# Patient Record
Sex: FEMALE | Race: BLACK OR AFRICAN AMERICAN | NOT HISPANIC OR LATINO | Employment: FULL TIME | URBAN - METROPOLITAN AREA
[De-identification: names, ages, dates, MRNs, and addresses within clinical notes are randomized per-mention and may not be internally consistent; named-entity substitution may affect disease eponyms.]

---

## 2024-07-05 ENCOUNTER — OFFICE VISIT (OUTPATIENT)
Dept: OPHTHALMOLOGY | Facility: CLINIC | Age: 54
End: 2024-07-05
Payer: COMMERCIAL

## 2024-07-05 DIAGNOSIS — H40.1221 LOW-TENSION GLAUCOMA OF LEFT EYE, MILD STAGE: ICD-10-CM

## 2024-07-05 DIAGNOSIS — E11.9 DIABETES MELLITUS WITHOUT COMPLICATION (MULTI): ICD-10-CM

## 2024-07-05 DIAGNOSIS — H25.13 AGE-RELATED NUCLEAR CATARACT OF BOTH EYES: ICD-10-CM

## 2024-07-05 DIAGNOSIS — H40.1212 LOW-TENSION GLAUCOMA OF RIGHT EYE, MODERATE STAGE: Primary | ICD-10-CM

## 2024-07-05 DIAGNOSIS — H52.7 UNSPECIFIED DISORDER OF REFRACTION: ICD-10-CM

## 2024-07-05 PROCEDURE — 92133 CPTRZD OPH DX IMG PST SGM ON: CPT | Performed by: OPHTHALMOLOGY

## 2024-07-05 PROCEDURE — 99204 OFFICE O/P NEW MOD 45 MIN: CPT | Performed by: OPHTHALMOLOGY

## 2024-07-05 PROCEDURE — 99214 OFFICE O/P EST MOD 30 MIN: CPT | Performed by: OPHTHALMOLOGY

## 2024-07-05 RX ORDER — LOSARTAN POTASSIUM 25 MG/1
TABLET ORAL EVERY 24 HOURS
COMMUNITY

## 2024-07-05 RX ORDER — LATANOPROST 50 UG/ML
1 SOLUTION/ DROPS OPHTHALMIC NIGHTLY
Qty: 2.5 ML | Refills: 11 | Status: SHIPPED | OUTPATIENT
Start: 2024-07-05 | End: 2025-07-05

## 2024-07-05 RX ORDER — DAPAGLIFLOZIN 10 MG/1
1 TABLET, FILM COATED ORAL DAILY
COMMUNITY

## 2024-07-05 RX ORDER — LATANOPROST 50 UG/ML
1 SOLUTION/ DROPS OPHTHALMIC NIGHTLY
COMMUNITY
Start: 2023-04-02 | End: 2024-07-05 | Stop reason: ALTCHOICE

## 2024-07-05 RX ORDER — LOVASTATIN 20 MG/1
TABLET ORAL
COMMUNITY

## 2024-07-05 RX ORDER — ACETAMINOPHEN, DEXTROMETHORPHAN HBR, DOXYLAMINE SUCCINATE, PHENYLEPHRINE HCL 650; 20; 12.5; 1 MG/30ML; MG/30ML; MG/30ML; MG/30ML
SOLUTION ORAL
COMMUNITY

## 2024-07-05 RX ORDER — ERGOCALCIFEROL 1.25 MG/1
CAPSULE ORAL
COMMUNITY

## 2024-07-05 ASSESSMENT — KERATOMETRY
OS_K2POWER_DIOPTERS: 44.75
OS_AXISANGLE_DEGREES: 90
OD_K2POWER_DIOPTERS: 44.50
OD_AXISANGLE_DEGREES: 80
METHOD_AUTO_MANUAL: AUTOMATED
OS_K1POWER_DIOPTERS: 44.00
OD_AXISANGLE2_DEGREES: 170
OD_K1POWER_DIOPTERS: 43.50
OS_AXISANGLE2_DEGREES: 180

## 2024-07-05 ASSESSMENT — REFRACTION_WEARINGRX
OS_SPHERE: -1.00
OS_AXIS: 047
OD_AXIS: 081
OD_SPHERE: -1.25
OD_CYLINDER: -0.25
OS_CYLINDER: -0.25

## 2024-07-05 ASSESSMENT — PACHYMETRY
OD_CT(UM): 505
OS_CT(UM): 519

## 2024-07-05 ASSESSMENT — ENCOUNTER SYMPTOMS
EYES NEGATIVE: 0
CONSTITUTIONAL NEGATIVE: 0
CARDIOVASCULAR NEGATIVE: 0
NEUROLOGICAL NEGATIVE: 0
ENDOCRINE NEGATIVE: 0
RESPIRATORY NEGATIVE: 0
GASTROINTESTINAL NEGATIVE: 0
MUSCULOSKELETAL NEGATIVE: 0
HEMATOLOGIC/LYMPHATIC NEGATIVE: 0
PSYCHIATRIC NEGATIVE: 0
ALLERGIC/IMMUNOLOGIC NEGATIVE: 0

## 2024-07-05 ASSESSMENT — SLIT LAMP EXAM - LIDS
COMMENTS: NORMAL
COMMENTS: NORMAL

## 2024-07-05 ASSESSMENT — VISUAL ACUITY
METHOD: SNELLEN - SINGLE
OD_CC: 20/25
CORRECTION_TYPE: GLASSES
OD_CC+: +1
OS_CC: 20/20
OS_CC+: -1

## 2024-07-05 ASSESSMENT — REFRACTION_MANIFEST
METHOD_AUTOREFRACTION: 1
OS_SPHERE: -1.25
OS_CYLINDER: -0.25
OD_AXIS: 045
OD_CYLINDER: -0.25
OD_SPHERE: -1.25
OS_AXIS: 165

## 2024-07-05 ASSESSMENT — TONOMETRY
OS_IOP_MMHG: 16
OD_IOP_MMHG: 16
IOP_METHOD: GOLDMANN APPLANATION

## 2024-07-05 ASSESSMENT — PATIENT HEALTH QUESTIONNAIRE - PHQ9
2. FEELING DOWN, DEPRESSED OR HOPELESS: NOT AT ALL
1. LITTLE INTEREST OR PLEASURE IN DOING THINGS: NOT AT ALL
SUM OF ALL RESPONSES TO PHQ9 QUESTIONS 1 AND 2: 0

## 2024-07-05 ASSESSMENT — EXTERNAL EXAM - RIGHT EYE: OD_EXAM: NORMAL

## 2024-07-05 ASSESSMENT — CUP TO DISC RATIO
OD_RATIO: 0.65
OS_RATIO: 0.5

## 2024-07-05 ASSESSMENT — EXTERNAL EXAM - LEFT EYE: OS_EXAM: NORMAL

## 2024-07-05 ASSESSMENT — PAIN SCALES - GENERAL: PAINLEVEL: 0-NO PAIN

## 2024-07-05 NOTE — PROGRESS NOTES
Assessment/Plan   Problem List Items Addressed This Visit       Low-tension glaucoma of right eye, moderate stage     OCT with potential RNFL reduction with time, overall better quality than previous but still seems consistent with glaucomatous damage. Would consider low tension. Will restart latanoprost at bedtime and monitor with serial exam.          Relevant Medications    latanoprost (Xalatan) 0.005 % ophthalmic solution    Other Relevant Orders    OCT, Optic Nerve - OU - Both Eyes (Completed)    Low-tension glaucoma of left eye, mild stage - Primary     RNFL more stable, no significant reduction in interim. Might represent NTG that does not progress. With treating right eye (OD) will recommend treatment left eye (OS) as well.          Relevant Medications    latanoprost (Xalatan) 0.005 % ophthalmic solution    Other Relevant Orders    OCT, Optic Nerve - OU - Both Eyes (Completed)    Diabetes mellitus without complication (Multi)     Advised no signs of diabetic changes on exam. Recommend to continue best sugar control and on need for annual checkup. Understands role of good BP control and weight loss if applicable. Discussed some components of selected studies like WESDR, DCCT, and UKPDS to describe the likely course of diabetes and effects on the eyes.           Age-related nuclear cataract of both eyes     Non significant cataract noted on exam. Will plan to continue to monitor with serial exam.            Unspecified disorder of refraction     Discussed glasses prescription from refraction. Will provide if patient interested in keeping for records or to fill as a new set of glasses.               Provided reassurance regarding above diagnoses and care received in the office visit today. Discussed outcomes and options along with the importance of treatment compliance. Understands the importance of any follow up visits. Patient instructed to call/communicate with our office if any new issues, questions, or  concerns.     Will plan to see back in 6 months short check with OCT nerve or sooner PRN

## 2024-07-05 NOTE — ASSESSMENT & PLAN NOTE
OCT with potential RNFL reduction with time, overall better quality than previous but still seems consistent with glaucomatous damage. Would consider low tension. Will restart latanoprost at bedtime and monitor with serial exam.

## 2024-07-05 NOTE — ASSESSMENT & PLAN NOTE
RNFL more stable, no significant reduction in interim. Might represent NTG that does not progress. With treating right eye (OD) will recommend treatment left eye (OS) as well.

## 2024-07-05 NOTE — PATIENT INSTRUCTIONS
Thank you so much for choosing me to provide your care today!    If you were dilated your vision may remain blurry   or light sensitive for several hours.    The nature of eye and vision problems can require frequent follow up, please make every effort to adhere to any future appointments.    If you have any issues, questions, or concerns,   please do not hesitate to reach out.    If you receive a survey in regards to your care today, please mention any exceptional care my office staff and/or technicians provided.    You can reach our office at this number:  [phone]

## 2024-07-05 NOTE — LETTER
July 5, 2024    Maikel Garcse MD  [address] [address] [address], [address]  [facility] [address]    Patient: Milagros Parra   YOB: 1970   Date of Visit: 7/5/2024       Dear Dr. Maikel Garces MD:    Milagros Parra will be establishing care with you at a future visit. Here is my assessment and plan of care:    Assessment/Plan:  Milagros was seen today for annual exam and decreased visual acuity.  Diagnoses and all orders for this visit:  Low-tension glaucoma of right eye, moderate stage (Primary)  -     OCT, Optic Nerve - OU - Both Eyes  -     latanoprost (Xalatan) 0.005 % ophthalmic solution; Administer 1 drop into both eyes once daily at bedtime.  Low-tension glaucoma of left eye, mild stage  -     OCT, Optic Nerve - OU - Both Eyes  -     latanoprost (Xalatan) 0.005 % ophthalmic solution; Administer 1 drop into both eyes once daily at bedtime.  Diabetes mellitus without complication (Multi)  Age-related nuclear cataract of both eyes  Unspecified disorder of refraction    Right eye:     Left eye:    [x] No diabetes mellitus (DM) retinopathy [x] No diabetes mellitus (DM) retinopathy    [] Mild non-proliferative retinopathy [] Mild non-proliferative retinopathy    [] Moderate non-proliferative retinopathy [] Moderate non-proliferative retinopathy    [] Severe non-proliferative retinopathy [] Severe non-proliferative retinopathy    [] Proliferative retinopathy   [] Proliferative retinopathy    [] Diabetic macular edema   [] Diabetic macular edema    Urged patient to continue to work on best blood sugar and blood pressure control  Advised patient to call if any new vision changes noted    Will plan to repeat evaluation in:   [] 3 months [] 6 months [] 9 months [x] 12 months [] Other    Below you can find relevant pieces of the exam. If you have questions, please do not hesitate to call me. I look forward to following Milagros along with you.         Sincerely,        Chris Calloway,  "MD        CC:   No Recipients         External Exam         Right Left    External Normal Normal              Slit Lamp Exam         Right Left    Lids/Lashes Normal Normal    Conjunctiva/Sclera White and quiet White and quiet    Cornea Clear Clear    Anterior Chamber Deep and quiet Deep and quiet    Iris Round and reactive Round and reactive    Lens 1+ nuclear sclerosis 1+ Nuclear sclerosis              Fundus Exam         Right Left    Vitreous vitreous clear and normal vitreous clear and normal    Disc Normal Normal    C/D Ratio 0.65 0.5    Macula Normal Normal    Vessels Normal Normal    Periphery Normal Normal                   <div id=\"MAIN_EXAM_REVIEWED\"></div>     "

## 2024-07-31 ENCOUNTER — APPOINTMENT (OUTPATIENT)
Dept: PRIMARY CARE | Facility: CLINIC | Age: 54
End: 2024-07-31
Payer: COMMERCIAL

## 2024-08-01 ENCOUNTER — APPOINTMENT (OUTPATIENT)
Dept: PRIMARY CARE | Facility: CLINIC | Age: 54
End: 2024-08-01
Payer: COMMERCIAL

## 2024-09-03 ENCOUNTER — APPOINTMENT (OUTPATIENT)
Dept: PRIMARY CARE | Facility: CLINIC | Age: 54
End: 2024-09-03
Payer: COMMERCIAL

## 2024-09-20 ENCOUNTER — HOSPITAL ENCOUNTER (OUTPATIENT)
Dept: RADIOLOGY | Facility: HOSPITAL | Age: 54
Discharge: HOME | End: 2024-09-20
Payer: COMMERCIAL

## 2024-09-20 VITALS — HEIGHT: 59 IN | BODY MASS INDEX: 45.16 KG/M2 | WEIGHT: 224 LBS

## 2024-09-20 DIAGNOSIS — Z12.31 SCREENING MAMMOGRAM FOR BREAST CANCER: ICD-10-CM

## 2024-09-20 PROCEDURE — 77067 SCR MAMMO BI INCL CAD: CPT

## 2024-10-02 ENCOUNTER — OFFICE VISIT (OUTPATIENT)
Dept: PRIMARY CARE | Facility: CLINIC | Age: 54
End: 2024-10-02
Payer: COMMERCIAL

## 2024-10-02 ENCOUNTER — LAB (OUTPATIENT)
Dept: LAB | Facility: LAB | Age: 54
End: 2024-10-02
Payer: COMMERCIAL

## 2024-10-02 VITALS
HEART RATE: 70 BPM | WEIGHT: 223 LBS | TEMPERATURE: 97.8 F | OXYGEN SATURATION: 97 % | DIASTOLIC BLOOD PRESSURE: 82 MMHG | SYSTOLIC BLOOD PRESSURE: 140 MMHG | HEIGHT: 59 IN | BODY MASS INDEX: 44.96 KG/M2

## 2024-10-02 DIAGNOSIS — E78.5 DYSLIPIDEMIA: ICD-10-CM

## 2024-10-02 DIAGNOSIS — E11.9 TYPE 2 DIABETES MELLITUS WITHOUT COMPLICATION, WITHOUT LONG-TERM CURRENT USE OF INSULIN (MULTI): ICD-10-CM

## 2024-10-02 DIAGNOSIS — E04.1 THYROID CYST: ICD-10-CM

## 2024-10-02 DIAGNOSIS — K59.00 CONSTIPATION, UNSPECIFIED CONSTIPATION TYPE: ICD-10-CM

## 2024-10-02 DIAGNOSIS — N18.30 STAGE 3 CHRONIC KIDNEY DISEASE, UNSPECIFIED WHETHER STAGE 3A OR 3B CKD (MULTI): ICD-10-CM

## 2024-10-02 DIAGNOSIS — I10 ESSENTIAL HYPERTENSION: Primary | ICD-10-CM

## 2024-10-02 DIAGNOSIS — M54.32 LEFT SIDED SCIATICA: ICD-10-CM

## 2024-10-02 DIAGNOSIS — E55.9 VITAMIN D DEFICIENCY: ICD-10-CM

## 2024-10-02 DIAGNOSIS — I10 ESSENTIAL HYPERTENSION: ICD-10-CM

## 2024-10-02 LAB
25(OH)D3 SERPL-MCNC: 44 NG/ML (ref 30–100)
ANION GAP SERPL CALCULATED.3IONS-SCNC: 12 MMOL/L (ref 10–20)
BUN SERPL-MCNC: 11 MG/DL (ref 6–23)
CALCIUM SERPL-MCNC: 9 MG/DL (ref 8.6–10.3)
CHLORIDE SERPL-SCNC: 102 MMOL/L (ref 98–107)
CHOLEST SERPL-MCNC: 168 MG/DL (ref 0–199)
CHOLEST/HDLC SERPL: 2.9 {RATIO}
CO2 SERPL-SCNC: 29 MMOL/L (ref 21–32)
CREAT SERPL-MCNC: 0.77 MG/DL (ref 0.5–1.05)
EGFRCR SERPLBLD CKD-EPI 2021: >90 ML/MIN/1.73M*2
EST. AVERAGE GLUCOSE BLD GHB EST-MCNC: 160 MG/DL
GLUCOSE SERPL-MCNC: 100 MG/DL (ref 74–99)
HBA1C MFR BLD: 7.2 %
HDLC SERPL-MCNC: 58.9 MG/DL
LDLC SERPL CALC-MCNC: 88 MG/DL
NON HDL CHOLESTEROL: 109 MG/DL (ref 0–149)
POTASSIUM SERPL-SCNC: 4 MMOL/L (ref 3.5–5.3)
SODIUM SERPL-SCNC: 139 MMOL/L (ref 136–145)
TRIGL SERPL-MCNC: 104 MG/DL (ref 0–149)
TSH SERPL-ACNC: 2.86 MIU/L (ref 0.44–3.98)
VLDL: 21 MG/DL (ref 0–40)

## 2024-10-02 PROCEDURE — 82306 VITAMIN D 25 HYDROXY: CPT

## 2024-10-02 PROCEDURE — 3079F DIAST BP 80-89 MM HG: CPT | Performed by: INTERNAL MEDICINE

## 2024-10-02 PROCEDURE — 3048F LDL-C <100 MG/DL: CPT | Performed by: INTERNAL MEDICINE

## 2024-10-02 PROCEDURE — 3008F BODY MASS INDEX DOCD: CPT | Performed by: INTERNAL MEDICINE

## 2024-10-02 PROCEDURE — 80061 LIPID PANEL: CPT

## 2024-10-02 PROCEDURE — 99204 OFFICE O/P NEW MOD 45 MIN: CPT | Performed by: INTERNAL MEDICINE

## 2024-10-02 PROCEDURE — 84443 ASSAY THYROID STIM HORMONE: CPT

## 2024-10-02 PROCEDURE — 80048 BASIC METABOLIC PNL TOTAL CA: CPT

## 2024-10-02 PROCEDURE — 3077F SYST BP >= 140 MM HG: CPT | Performed by: INTERNAL MEDICINE

## 2024-10-02 PROCEDURE — 83036 HEMOGLOBIN GLYCOSYLATED A1C: CPT

## 2024-10-02 PROCEDURE — 4010F ACE/ARB THERAPY RXD/TAKEN: CPT | Performed by: INTERNAL MEDICINE

## 2024-10-02 PROCEDURE — 36415 COLL VENOUS BLD VENIPUNCTURE: CPT

## 2024-10-02 PROCEDURE — 3051F HG A1C>EQUAL 7.0%<8.0%: CPT | Performed by: INTERNAL MEDICINE

## 2024-10-02 RX ORDER — DAPAGLIFLOZIN 10 MG/1
10 TABLET, FILM COATED ORAL DAILY
Qty: 90 TABLET | Refills: 1 | Status: SHIPPED | OUTPATIENT
Start: 2024-10-02

## 2024-10-02 RX ORDER — LOSARTAN POTASSIUM 50 MG/1
50 TABLET ORAL EVERY 24 HOURS
Qty: 90 TABLET | Refills: 1 | Status: SHIPPED | OUTPATIENT
Start: 2024-10-02

## 2024-10-02 RX ORDER — LOVASTATIN 20 MG/1
20 TABLET ORAL NIGHTLY
Qty: 90 TABLET | Refills: 1 | Status: SHIPPED | OUTPATIENT
Start: 2024-10-02

## 2024-10-02 RX ORDER — ERGOCALCIFEROL 1.25 MG/1
50000 CAPSULE ORAL WEEKLY
Qty: 90 CAPSULE | Refills: 1 | Status: SHIPPED | OUTPATIENT
Start: 2024-10-02

## 2024-10-02 ASSESSMENT — ENCOUNTER SYMPTOMS
DEPRESSION: 0
OCCASIONAL FEELINGS OF UNSTEADINESS: 0
LOSS OF SENSATION IN FEET: 0

## 2024-10-02 ASSESSMENT — COLUMBIA-SUICIDE SEVERITY RATING SCALE - C-SSRS
6. HAVE YOU EVER DONE ANYTHING, STARTED TO DO ANYTHING, OR PREPARED TO DO ANYTHING TO END YOUR LIFE?: NO
2. HAVE YOU ACTUALLY HAD ANY THOUGHTS OF KILLING YOURSELF?: NO
1. IN THE PAST MONTH, HAVE YOU WISHED YOU WERE DEAD OR WISHED YOU COULD GO TO SLEEP AND NOT WAKE UP?: NO

## 2024-10-02 ASSESSMENT — PAIN SCALES - GENERAL: PAINLEVEL: 0-NO PAIN

## 2024-10-02 ASSESSMENT — PATIENT HEALTH QUESTIONNAIRE - PHQ9
SUM OF ALL RESPONSES TO PHQ9 QUESTIONS 1 AND 2: 0
2. FEELING DOWN, DEPRESSED OR HOPELESS: NOT AT ALL
1. LITTLE INTEREST OR PLEASURE IN DOING THINGS: NOT AT ALL

## 2024-10-02 NOTE — PROGRESS NOTES
"Subjective   Patient ID: Milagros Parra is a 54 y.o. female who presents for Establish Care (Sciatic nerve, thyroid issues, pt would like colonoscopy/constipation).    HPI     She was patient of Dr Dodd  She has history of hypertension, diabetes mellitus, CKD  She was last seen at [facility] less than a month ago  H/O DM- was started on Farxiga by her previous PCP,  stopped metformin ( no side effects)   Chronic constipation, taking miralax helps, per patient had colonoscopy in 2021  H/O thyroid cyst, last US was in 2022  Right biceps tendon repair in right shoulder, Dr Bernal in 2020  Left sided sciatic pain for past few weeks X 2 years  H/O Hypertension- stopped losartan few years ago, not sure of the reason, was on 100 mg, she was just started on 25 mg when she went to ER few months ago    Complaining of left-sided low back pain radiating down left leg, denied any tingling or numbness/weakness of left lower extremity    Review of Systems   Constitutional:  Negative for chills, fatigue and unexpected weight change.   HENT:  Negative for postnasal drip, sinus pressure and trouble swallowing.    Respiratory:  Negative for cough, shortness of breath and wheezing.    Cardiovascular:  Negative for chest pain, palpitations and leg swelling.   Gastrointestinal:  Positive for constipation. Negative for abdominal pain, blood in stool, nausea and vomiting.   Endocrine: Negative for polydipsia, polyphagia and polyuria.   Genitourinary:  Negative for dysuria and frequency.   Musculoskeletal:  Positive for back pain. Negative for myalgias.   Skin:  Negative for rash.   Neurological:  Negative for tremors, seizures and numbness.   Psychiatric/Behavioral:  Negative for behavioral problems.        Objective   /82 (BP Location: Left arm, Patient Position: Sitting, BP Cuff Size: Large adult)   Pulse 70   Temp 36.6 °C (97.8 °F) (Temporal)   Ht 1.499 m (4' 11\")   Wt 101 kg (223 lb)   SpO2 97%   BMI 45.04 kg/m² "     Physical Exam  Constitutional:       General: She is not in acute distress.     Appearance: Normal appearance.   HENT:      Head: Normocephalic and atraumatic.   Eyes:      Extraocular Movements: Extraocular movements intact.      Conjunctiva/sclera: Conjunctivae normal.      Pupils: Pupils are equal, round, and reactive to light.   Cardiovascular:      Rate and Rhythm: Normal rate and regular rhythm.      Heart sounds: Normal heart sounds. No murmur heard.     No friction rub.   Pulmonary:      Effort: Pulmonary effort is normal.      Breath sounds: Normal breath sounds. No wheezing or rales.   Abdominal:      General: Bowel sounds are normal.      Palpations: Abdomen is soft.      Tenderness: There is no abdominal tenderness. There is no guarding.   Musculoskeletal:         General: Normal range of motion.      Cervical back: Normal range of motion and neck supple.      Right lower leg: No edema.      Left lower leg: No edema.   Lymphadenopathy:      Cervical: No cervical adenopathy.   Neurological:      General: No focal deficit present.      Mental Status: She is alert and oriented to person, place, and time.      Cranial Nerves: No cranial nerve deficit.      Gait: Gait normal.   Psychiatric:         Mood and Affect: Mood normal.         Assessment/Plan        Milagros was seen today for establish care.  Diagnoses and all orders for this visit:  Essential hypertension (Primary)  -     losartan (Cozaar) 50 mg tablet; Take 1 tablet (50 mg) by mouth once every 24 hours.  -     Basic Metabolic Panel; Future  Type 2 diabetes mellitus without complication, without long-term current use of insulin (Multi)  -     dapagliflozin propanediol (Farxiga) 10 mg; Take 1 tablet (10 mg) by mouth once daily.  -     Hemoglobin A1C; Future  Thyroid cyst  -     TSH with reflex to Free T4 if abnormal; Future  -     US thyroid; Future  Vitamin D deficiency  -     ergocalciferol (Vitamin D-2) 1.25 MG (79375 UT) capsule; Take 1  capsule (50,000 Units) by mouth 1 (one) time per week.  -     Vitamin D 25-Hydroxy,Total (for eval of Vitamin D levels); Future  Stage 3 chronic kidney disease, unspecified whether stage 3a or 3b CKD (Multi)  Dyslipidemia  -     lovastatin (Mevacor) 20 mg tablet; Take 1 tablet (20 mg) by mouth once daily at bedtime.  -     Lipid Panel; Future  Left sided sciatica  -     Referral to Physical Therapy; Future  Constipation, unspecified constipation type    Advise low-salt diet, increased losartan to 50 mg from 25 mg  Check blood pressure at home, follow-up with home blood pressure readings,  Our goal blood pressure reading is less than 140/80  Referred to physical therapy for left-sided sciatica    Past Medical History:   Diagnosis Date    Age-related nuclear cataract of both eyes 7/5/2024    Diabetes mellitus without complication (Multi) 7/5/2024    Low-tension glaucoma of left eye, mild stage 7/5/2024    Low-tension glaucoma of right eye, moderate stage 7/5/2024    Unspecified disorder of refraction 7/5/2024     Past Surgical History:   Procedure Laterality Date    HYSTERECTOMY         [facility]  Outside Information  Results  CT angio chest w and wo IV contrast (Order [id number])     CT angio chest w and wo IV contrast  Order: [id number]  Impression    IMPRESSION:  1.  Normal thoracoabdominal aorta.  2.  Hepatic steatosis  3.  Colonic diverticulosis          Transcribed Using Voice Recognition  Transcribe Date/Time: Aug 14 2024  9:55P    Dictated by: PATRICIA RIVERA MD    This examination was interpreted and the report reviewed and  electronically signed by:  PATRICIA RIVERA MD on Aug 14 2024 10:03PM  EST  Narrative    * * *Final Report* * *    DATE OF EXAM: Aug 14 2024  9:29PM      EUC   0126  -  CTA CHEST (GATED) WO/W IVCON  / ACCESSION #  [id number]    PROCEDURE REASON: Aortic dissection suspected        * * * * Physician Interpretation * * * *    RESULT: EXAMINATION:  CTA CHEST (GATED) WO/W IVCON, CTA  ABD/PEL W IVCON    CLINICAL HISTORY: Pain Acute Dissection    Technique:   --  3D MIP image were created, reviewed and archived .    Exam Date:  8/14/2024 9:29 PM  Comparison: Chest radiograph 08/14/2024, CT abdomen and pelvis 11/27/2020    Contrast:   ml of Omnipaque 350    CT Radiation dose: Integrated Dose-length product (DLP) for this visit =    2047 mGy*cm  CT Dose Reduction Employed: Automated exposure control(AEC) and iterative  recon      RESULT:  No cardiomegaly or pericardial effusion.  Lungs are clear.  No  mediastinal, hilar lymphadenopathy.  Normal thoracoabdominal aorta without aneurysm, dissection, penetrating  atheromatous ulceration.  No pulmonary embolus.  Patent mesenteric, renal  arteries.  Patent branch iliac arteries.  Patent great vessels.    Hepatic steatosis. No radiopaque gallstones.  Normal appendix.  Colonic  diverticulosis.  Normal kidneys, adrenal glands, pancreas, spleen,  stomach, urinary bladder.  No suspicious osseous lesion.  Right  perivaginal cyst unchanged  Exam End: 08/14/24 21:29    Specimen Collected: 08/14/24 21:29      US thyroid  Order: [id number]  Narrative    PERFORMED AT [facility] LOCATION:[address]      Date of Service: 03/11/2022 18:53 Adm #: [id number]  Reading Dr:MARYCRUZ MORELOS Signoff Dr: MARYCRUZ MORELOS  PROCEDURE:     THYROID - WUS  2689  REASON FOR EXAM: E04.1 NONTOXIC SINGLE THYROID NODULE  RESULT: Clinical Information: Follow-up thyroid cyst.  Comparison study: 2/27/2020.  Real-time ultrasound of the thyroid gland was performed.  The right lobe measures 4.2 x 1.2 x 1.3 cm.   The left lobe measures 4.4 x 0.9 x 1.7 cm.   The thyroid isthmus measures 0.1 cm in thickness.  There is redemonstration of a small cyst in the upper pole of the left  thyroid lobe measuring 4 x 2 x 3 mm   similar to the prior exam. No new  lesions are identified. On Doppler ultrasound there is symmetric  bilateral blood flow.  Impression: Stable minute colloid cyst in the  upper pole of the left  thyroid lobe.  [id number]  This report has been produced using speech recognition.  Original Interpreting Physician:   MARYCRUZ MORELOS M.D.  Original Transcribed by/Date: PSCB   Mar 12 2022 11:08A  Original Electronically Signed by/Date: MARYCRUZ MORELOS M.D. Mar 12 2022  11:08A  Addendum Interpreting Physician:  Addendum Transcribed by/Date:   NO ADDENDUM  Addendum Electronically Signed by/Date:  Exam End: --    Specimen Collected: 03/11/22 Last Resulted: 03/11/22 00:00   Received From: [facility]  Result Received: 07/05/24 13:43      Current Outpatient Medications   Medication Instructions    cyanocobalamin, vitamin B-12, (Vitamin B-12) 1,000 mcg tablet extended release 1 tablet Orally three days a week    dapagliflozin propanediol (FARXIGA) 10 mg, oral, Daily    ergocalciferol (VITAMIN D-2) 50,000 Units, oral, Weekly    latanoprost (Xalatan) 0.005 % ophthalmic solution 1 drop, Both Eyes, Nightly    losartan (COZAAR) 50 mg, oral, Every 24 hours    lovastatin (MEVACOR) 20 mg, oral, Nightly

## 2024-10-03 ENCOUNTER — HOSPITAL ENCOUNTER (OUTPATIENT)
Dept: RADIOLOGY | Facility: HOSPITAL | Age: 54
Discharge: HOME | End: 2024-10-03
Payer: COMMERCIAL

## 2024-10-03 ENCOUNTER — APPOINTMENT (OUTPATIENT)
Dept: RADIOLOGY | Facility: HOSPITAL | Age: 54
End: 2024-10-03
Payer: COMMERCIAL

## 2024-10-03 DIAGNOSIS — E04.1 THYROID CYST: ICD-10-CM

## 2024-10-03 PROCEDURE — 76536 US EXAM OF HEAD AND NECK: CPT

## 2024-10-07 ASSESSMENT — ENCOUNTER SYMPTOMS
MYALGIAS: 0
POLYPHAGIA: 0
NUMBNESS: 0
SHORTNESS OF BREATH: 0
COUGH: 0
BACK PAIN: 1
SEIZURES: 0
TREMORS: 0
BLOOD IN STOOL: 0
FREQUENCY: 0
UNEXPECTED WEIGHT CHANGE: 0
FATIGUE: 0
ABDOMINAL PAIN: 0
WHEEZING: 0
POLYDIPSIA: 0
DYSURIA: 0
VOMITING: 0
NAUSEA: 0
CHILLS: 0
CONSTIPATION: 1
TROUBLE SWALLOWING: 0
SINUS PRESSURE: 0
PALPITATIONS: 0

## 2024-10-11 NOTE — TELEPHONE ENCOUNTER
CALLED TO PROCESS PRIOR AUTH. SPOKE WITH REY MATHUR AT THE PATIENTS INSURANCE PLAN, WHEN PROCESSING THE PRIOR AUTH THE ID NUMBER IS DIFFERENT. REENTERED THE INFORMATION INTO COVER MY MEDS AND WILL HAVE AN ANSWER IN 5 DAYS

## 2024-10-13 ENCOUNTER — HOSPITAL ENCOUNTER (EMERGENCY)
Facility: HOSPITAL | Age: 54
Discharge: HOME | End: 2024-10-13
Payer: COMMERCIAL

## 2024-10-13 VITALS
RESPIRATION RATE: 18 BRPM | WEIGHT: 225 LBS | HEART RATE: 60 BPM | TEMPERATURE: 96.8 F | HEIGHT: 59 IN | SYSTOLIC BLOOD PRESSURE: 130 MMHG | DIASTOLIC BLOOD PRESSURE: 74 MMHG | OXYGEN SATURATION: 100 % | BODY MASS INDEX: 45.36 KG/M2
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
## 2024-10-13 DIAGNOSIS — L03.115 CELLULITIS OF RIGHT LOWER EXTREMITY: ICD-10-CM

## 2024-10-13 PROCEDURE — 2500000001 HC RX 250 WO HCPCS SELF ADMINISTERED DRUGS (ALT 637 FOR MEDICARE OP): Performed by: PHYSICIAN ASSISTANT

## 2024-10-13 PROCEDURE — 99283 EMERGENCY DEPT VISIT LOW MDM: CPT

## 2024-10-13 RX ORDER — DIPHENHYDRAMINE HCL 25 MG
25 TABLET ORAL EVERY 6 HOURS
Qty: 20 TABLET | Refills: 0 | Status: SHIPPED | OUTPATIENT
Start: 2024-10-13 | End: 2024-10-18

## 2024-10-13 RX ORDER — DIPHENHYDRAMINE HCL 25 MG
25 TABLET ORAL ONCE
Status: COMPLETED | OUTPATIENT
Start: 2024-10-13 | End: 2024-10-13

## 2024-10-13 RX ORDER — DOXYCYCLINE 100 MG/1
100 CAPSULE ORAL ONCE
Status: COMPLETED | OUTPATIENT
Start: 2024-10-13 | End: 2024-10-13

## 2024-10-13 RX ORDER — DOXYCYCLINE 100 MG/1
100 CAPSULE ORAL 2 TIMES DAILY
Qty: 20 CAPSULE | Refills: 0 | Status: SHIPPED | OUTPATIENT
Start: 2024-10-13 | End: 2024-10-23

## 2024-10-13 RX ORDER — HYDROCORTISONE 1 %
1 CREAM (GRAM) TOPICAL 2 TIMES DAILY
Qty: 6 G | Refills: 0 | Status: SHIPPED | OUTPATIENT
Start: 2024-10-13 | End: 2024-11-12

## 2024-10-13 ASSESSMENT — LIFESTYLE VARIABLES
EVER FELT BAD OR GUILTY ABOUT YOUR DRINKING: NO
HAVE PEOPLE ANNOYED YOU BY CRITICIZING YOUR DRINKING: NO
HAVE YOU EVER FELT YOU SHOULD CUT DOWN ON YOUR DRINKING: NO
EVER HAD A DRINK FIRST THING IN THE MORNING TO STEADY YOUR NERVES TO GET RID OF A HANGOVER: NO
TOTAL SCORE: 0

## 2024-10-13 ASSESSMENT — COLUMBIA-SUICIDE SEVERITY RATING SCALE - C-SSRS
1. IN THE PAST MONTH, HAVE YOU WISHED YOU WERE DEAD OR WISHED YOU COULD GO TO SLEEP AND NOT WAKE UP?: NO
2. HAVE YOU ACTUALLY HAD ANY THOUGHTS OF KILLING YOURSELF?: NO
6. HAVE YOU EVER DONE ANYTHING, STARTED TO DO ANYTHING, OR PREPARED TO DO ANYTHING TO END YOUR LIFE?: NO

## 2024-10-13 ASSESSMENT — PAIN - FUNCTIONAL ASSESSMENT: PAIN_FUNCTIONAL_ASSESSMENT: 0-10

## 2024-10-13 ASSESSMENT — PAIN SCALES - GENERAL: PAINLEVEL_OUTOF10: 0 - NO PAIN

## 2024-10-13 NOTE — ED PROVIDER NOTES
HPI   Chief Complaint   Patient presents with    Insect Bite       54-year-old female presented emergency department the chief complaint of redness to the anterior aspect of her right lower extremity.  She was bit by a bug several days ago.  She states it is now warm and red and irritated.  She denies lightheadedness dizziness numbness weakness.  Well-appearing nontoxic afebrile.  No other complaint.              Patient History   Past Medical History:   Diagnosis Date    Age-related nuclear cataract of both eyes 7/5/2024    Diabetes mellitus without complication (Multi) 7/5/2024    Low-tension glaucoma of left eye, mild stage 7/5/2024    Low-tension glaucoma of right eye, moderate stage 7/5/2024    Unspecified disorder of refraction 7/5/2024     Past Surgical History:   Procedure Laterality Date    HYSTERECTOMY       Family History   Problem Relation Name Age of Onset    Colon cancer Maternal Grandmother Tameka rabago      Social History     Tobacco Use    Smoking status: Never    Smokeless tobacco: Never   Vaping Use    Vaping status: Never Used   Substance Use Topics    Alcohol use: Not Currently    Drug use: Never       Physical Exam   ED Triage Vitals [10/13/24 1439]   Temperature Heart Rate Respirations BP   36 °C (96.8 °F) 60 18 130/74      Pulse Ox Temp Source Heart Rate Source Patient Position   100 % Temporal -- --      BP Location FiO2 (%)     -- --       Physical Exam  Vitals and nursing note reviewed.   Constitutional:       Appearance: Normal appearance.   HENT:      Nose: Nose normal.      Mouth/Throat:      Mouth: Mucous membranes are moist.   Cardiovascular:      Pulses: Normal pulses.   Pulmonary:      Effort: Pulmonary effort is normal.   Musculoskeletal:         General: Normal range of motion.      Cervical back: Normal range of motion.   Skin:     General: Skin is warm.      Comments: Distal anterior right lower extremity erythematous warm to touch   Neurological:      General: No focal deficit  present.      Mental Status: She is alert and oriented to person, place, and time.   Psychiatric:         Mood and Affect: Mood normal.           ED Course & MDM   Diagnoses as of 10/13/24 1516   Insect bite, unspecified site, initial encounter   Cellulitis of right lower extremity                 No data recorded     Gregg Coma Scale Score: 15 (10/13/24 1441 : Agustina Fernandez RN)                           Medical Decision Making  I have seen and evaluated this patient.  Physician available for consultation.  Vital signs have been reviewed.  All laboratory and diagnostic imaging is reviewed by myself and interpreted by myself unless otherwise stated.  Additionally imaging is interpreted by radiologist.    Consistent with cellulitic infection.  Started on antibiotic.  Can take Benadryl as needed for itching.  Released in stable condition from emergent standpoint.    Labs Reviewed - No data to display  No orders to display     Medications   doxycycline (Vibramycin) capsule 100 mg (100 mg oral Given 10/13/24 1511)   diphenhydrAMINE (Sominex) tablet 25 mg (25 mg oral Given 10/13/24 1511)     New Prescriptions    DIPHENHYDRAMINE (SOMINEX) 25 MG TABLET    Take 1 tablet (25 mg) by mouth every 6 hours for 5 days.    DOXYCYCLINE (VIBRAMYCIN) 100 MG CAPSULE    Take 1 capsule (100 mg) by mouth 2 times a day for 10 days. Take with at least 8 ounces (large glass) of water, do not lie down for 30 minutes after    HYDROCORTISONE 1 % CREAM    Apply 1 Application topically 2 times a day. Apply to affected area 2 times daily         Procedure  Procedures     Michel Montiel PA-C  10/13/24 1516

## 2024-10-29 ENCOUNTER — EVALUATION (OUTPATIENT)
Dept: PHYSICAL THERAPY | Facility: CLINIC | Age: 54
End: 2024-10-29
Payer: COMMERCIAL

## 2024-10-29 DIAGNOSIS — M54.32 LEFT SIDED SCIATICA: ICD-10-CM

## 2024-10-29 PROCEDURE — 97110 THERAPEUTIC EXERCISES: CPT | Mod: GP | Performed by: PHYSICAL THERAPIST

## 2024-10-29 PROCEDURE — 97530 THERAPEUTIC ACTIVITIES: CPT | Mod: GP | Performed by: PHYSICAL THERAPIST

## 2024-10-29 PROCEDURE — 97161 PT EVAL LOW COMPLEX 20 MIN: CPT | Mod: GP | Performed by: PHYSICAL THERAPIST

## 2024-10-29 ASSESSMENT — ENCOUNTER SYMPTOMS
DEPRESSION: 0
OCCASIONAL FEELINGS OF UNSTEADINESS: 1
LOSS OF SENSATION IN FEET: 1

## 2024-11-11 ENCOUNTER — TREATMENT (OUTPATIENT)
Dept: PHYSICAL THERAPY | Facility: CLINIC | Age: 54
End: 2024-11-11
Payer: COMMERCIAL

## 2024-11-11 DIAGNOSIS — M54.32 LEFT SIDED SCIATICA: ICD-10-CM

## 2024-11-11 PROCEDURE — 97110 THERAPEUTIC EXERCISES: CPT | Mod: GP | Performed by: PHYSICAL THERAPIST

## 2024-11-11 NOTE — PROGRESS NOTES
"Physical Therapy Treatment    Patient Name: Milagros Parra  MRN: [id number]  Today's Date: 11/11/2024  Time Calculation  Start Time: 1615  Stop Time: 1655  Time Calculation (min): 40 min  PT Therapeutic Procedures Time Entry  Therapeutic Exercise Time Entry: 40    Insurance:  Visit number: 2 of 6  Authorization info: [id number]  Insurance Type: Payor: PORFIRIO / Plan: ANTHEM HMP / Product Type: *No Product type* /    Current Problem   1. Left sided sciatica  Follow Up In Physical Therapy          Subjective   General   Patient still having a lot of soreness in left hip when getting up. Has been doing exercises daily, finds they help.       Precautions: falls  Pain 7/10  Post Treatment Pain Level \"a lot of soreness\"     Objective   Left ITB with moderate tightness, increased distally     Treatments:  Therapeutic Exercise:  NuStep L4 x6 minutes  Gastroc stretch at wedge, 30 seconds x 3  Stand hamstring stretch, 30 seconds x 3  Stand adductor stretch at step, 30 seconds x 3  Stand heel raises off step edge, 2x10  Stand hip ABD, 2x10 ea R/L  Mini squats, 2x10   SLR 3x10 ea R/L   S/L hip ABD 3x10 ea R/L   LTR 2x10   Bridge - double 3x10   Bridge - single LE x10 ea R/L         Assessment   Assessment:   Pt tolerated there ex progressions without increases in pain/symptoms. Initial cues required for all exercises to ensure correct muscle activation of LE.       Plan: mobility and strength    OP EDUCATION: cont with HEP      Goals:   Active       Mobility       Goal 1       Start:  10/29/24    Expected End:  01/27/25       Pt will improve lumbar spine AROM to WNL to improve I/ADLs         Goal 2       Start:  10/29/24    Expected End:  01/27/25       Pt will improve LE flexibility to WNL to improve I/ADLs.             Pain       Goal 1       Start:  10/29/24    Expected End:  01/27/25       Pt will perform all work tasks with <3/10 pain         Goal 2       Start:  10/29/24    Expected End:  01/27/25       Pt will walk >1 mile for " fundraising walks with <3/10 pain

## 2024-11-18 ENCOUNTER — APPOINTMENT (OUTPATIENT)
Dept: GASTROENTEROLOGY | Facility: CLINIC | Age: 54
End: 2024-11-18
Payer: COMMERCIAL

## 2024-11-18 VITALS — HEIGHT: 59 IN | OXYGEN SATURATION: 97 % | BODY MASS INDEX: 44.96 KG/M2 | WEIGHT: 223 LBS | HEART RATE: 74 BPM

## 2024-11-18 DIAGNOSIS — R14.0 BLOATING: Primary | ICD-10-CM

## 2024-11-18 DIAGNOSIS — K59.09 CHRONIC CONSTIPATION: ICD-10-CM

## 2024-11-18 DIAGNOSIS — K21.9 GASTROESOPHAGEAL REFLUX DISEASE, UNSPECIFIED WHETHER ESOPHAGITIS PRESENT: ICD-10-CM

## 2024-11-18 DIAGNOSIS — R13.19 ESOPHAGEAL DYSPHAGIA: ICD-10-CM

## 2024-11-18 DIAGNOSIS — K62.5 RECTAL BLEEDING: ICD-10-CM

## 2024-11-18 DIAGNOSIS — Z80.0 FAMILY HISTORY OF COLON CANCER: ICD-10-CM

## 2024-11-18 PROCEDURE — 4010F ACE/ARB THERAPY RXD/TAKEN: CPT

## 2024-11-18 PROCEDURE — 99204 OFFICE O/P NEW MOD 45 MIN: CPT

## 2024-11-18 PROCEDURE — 3008F BODY MASS INDEX DOCD: CPT

## 2024-11-18 PROCEDURE — 3048F LDL-C <100 MG/DL: CPT

## 2024-11-18 PROCEDURE — 3051F HG A1C>EQUAL 7.0%<8.0%: CPT

## 2024-11-18 RX ORDER — POLYETHYLENE GLYCOL 3350 17 G/17G
17 POWDER, FOR SOLUTION ORAL DAILY
Qty: 1700 G | Refills: 11 | Status: SHIPPED | OUTPATIENT
Start: 2024-11-18

## 2024-11-18 RX ORDER — POLYETHYLENE GLYCOL 3350, SODIUM SULFATE ANHYDROUS, SODIUM BICARBONATE, SODIUM CHLORIDE, POTASSIUM CHLORIDE 236; 22.74; 6.74; 5.86; 2.97 G/4L; G/4L; G/4L; G/4L; G/4L
4000 POWDER, FOR SOLUTION ORAL ONCE
Qty: 4000 ML | Refills: 0 | Status: SHIPPED | OUTPATIENT
Start: 2024-11-18 | End: 2024-11-18

## 2024-11-18 RX ORDER — PANTOPRAZOLE SODIUM 40 MG/1
40 TABLET, DELAYED RELEASE ORAL DAILY
Qty: 30 TABLET | Refills: 11 | Status: SHIPPED | OUTPATIENT
Start: 2024-11-18 | End: 2025-11-18

## 2024-11-18 ASSESSMENT — ENCOUNTER SYMPTOMS
CHILLS: 0
VOMITING: 0
TROUBLE SWALLOWING: 1
SHORTNESS OF BREATH: 0
FEVER: 0
NAUSEA: 0
APPETITE CHANGE: 0
BLOOD IN STOOL: 1
ABDOMINAL DISTENTION: 1
COUGH: 0
FATIGUE: 0
ABDOMINAL PAIN: 1
DIARRHEA: 0
ANAL BLEEDING: 0
CONSTIPATION: 1
RECTAL PAIN: 1

## 2024-11-18 NOTE — PROGRESS NOTES
Subjective     History of Present Illness:   Milagros Parra is a 54 y.o. female with PMHx of diabetes, GERD, HTN, obesity who presents to GI clinic for further evaluation GERD and constipation    Today, patient here for horrible gas for years so severe she felt she had a heart attack.  Has choking and acid reflux waking her at night with ear drainage.  Tried generic prilosec, but thinks she was having liver trouble, so she decreased the dose.  Bread and mashed potatoes stick when swallowing, so she avoids. Tried to follow antireflux regimen. Has hoarse voice.  Feels rectum is tight since she has small thin stools and straining.  Tries to eat healthy, but is bloated.  Has been constipated most of her life. Tried gas-x, which helped, uses prn. Has blood when straining on stool.  Uses glycerin suppositories.  Denies regular NSAID use.  Denies diarrhea, melena, unintentional weight loss    Denies ETOH, smoking, marijuana  Denies fxh GI cancer- paternal grandma colon cancer.  Denies fhx IBD  Abdominal Surgeries: Hysterectomy,     Last colonoscopy 2020- diverticulosis  Last EGD 2020 for GERD: Duodenitis, gastritis, 2 cm hiatal hernia.  Pathology negative for H. pylori.      Past Medical History  As per HPI.     Social History  she  reports that she has never smoked. She has never used smokeless tobacco. She reports that she does not currently use alcohol. She reports that she does not use drugs.     Family History  her family history includes Colon cancer in her maternal grandmother.     Review of Systems  Review of Systems   Constitutional:  Negative for appetite change, chills, fatigue and fever.   HENT:  Positive for trouble swallowing.    Respiratory:  Negative for cough and shortness of breath.    Gastrointestinal:  Positive for abdominal distention, abdominal pain (epigastric), blood in stool, constipation and rectal pain. Negative for anal bleeding, diarrhea, nausea and vomiting.       Allergies  No  Known Allergies      Medications  Current Outpatient Medications   Medication Instructions    cyanocobalamin, vitamin B-12, (Vitamin B-12) 1,000 mcg tablet extended release 1 tablet Orally three days a week    dapagliflozin propanediol (FARXIGA) 10 mg, oral, Daily    diphenhydrAMINE (SOMINEX) 25 mg, oral, Every 6 hours    ergocalciferol (VITAMIN D-2) 50,000 Units, oral, Weekly    latanoprost (Xalatan) 0.005 % ophthalmic solution 1 drop, Both Eyes, Nightly    losartan (COZAAR) 50 mg, oral, Every 24 hours    lovastatin (MEVACOR) 20 mg, oral, Nightly    pantoprazole (PROTONIX) 40 mg, oral, Daily, Do not crush, chew, or split. Take 30 minutes before a meal    polyethylene glycol (Golytely) 236-22.74-6.74 -5.86 gram solution 4,000 mL, oral, Once, Drink 1/2 starting at 6:00 pm the night prior to the procedure and the other 1/2 5 hours prior to the procedure.    polyethylene glycol (MIRALAX) 17 g, oral, Daily, Mix 1 capful in 8 oz of liquid        Objective   Visit Vitals  Pulse 74      Physical Exam  Constitutional:       Appearance: Normal appearance. She is obese.   HENT:      Mouth/Throat:      Mouth: Mucous membranes are dry.      Pharynx: Oropharynx is clear.   Cardiovascular:      Rate and Rhythm: Normal rate and regular rhythm.   Pulmonary:      Effort: Pulmonary effort is normal.      Breath sounds: Normal breath sounds. No wheezing or rhonchi.   Abdominal:      General: Abdomen is flat. Bowel sounds are normal. There is no distension.      Palpations: Abdomen is soft. There is no hepatomegaly.      Tenderness: There is abdominal tenderness (epigastric). There is no guarding or rebound. Negative signs include Liriano's sign.      Hernia: No hernia is present.   Musculoskeletal:         General: Normal range of motion.   Skin:     General: Skin is warm and dry.   Neurological:      General: No focal deficit present.      Mental Status: She is alert and oriented to person, place, and time.   Psychiatric:         Mood  and Affect: Mood normal.         Behavior: Behavior normal.           Lab Results   Component Value Date    WBC 5.6 01/13/2023    WBC 6.6 11/07/2022    WBC 6.6 12/11/2020    HGB 11.4 (L) 01/13/2023    HGB 12.0 11/07/2022    HGB 11.6 (L) 12/11/2020    HCT 35.6 (L) 01/13/2023    HCT 38.1 11/07/2022    HCT 38.2 12/11/2020     01/13/2023     11/07/2022     12/11/2020     Lab Results   Component Value Date     10/02/2024     01/13/2023     11/07/2022    K 4.0 10/02/2024    K 4.5 01/13/2023    K 4.3 11/07/2022     10/02/2024     01/13/2023     11/07/2022    CO2 29 10/02/2024    CO2 23 (L) 01/13/2023    CO2 27 11/07/2022    BUN 11 10/02/2024    BUN 12 01/13/2023    BUN 8 11/07/2022    CREATININE 0.77 10/02/2024    CREATININE 0.8 01/13/2023    CREATININE 0.7 11/07/2022    CALCIUM 9.0 10/02/2024    CALCIUM 9.1 01/13/2023    CALCIUM 9.3 11/07/2022    PROT 7.3 11/07/2022    PROT 6.7 12/11/2020    PROT 6.8 08/18/2020    BILITOT 0.2 11/07/2022    BILITOT 0.3 12/11/2020    BILITOT 0.3 08/18/2020    ALKPHOS 102 11/07/2022    ALKPHOS 70 12/11/2020    ALKPHOS 60 08/18/2020    ALT 14 11/07/2022    ALT 8 12/11/2020    ALT 5 08/18/2020    AST 13 11/07/2022    AST 17 12/11/2020    AST 16 08/18/2020    GLUCOSE 100 (H) 10/02/2024    GLUCOSE 117 (H) 01/13/2023    GLUCOSE 117 (H) 11/07/2022           Milagros Parra is a 54 y.o. female who presents to GI clinic for constipation, GERD, esophageal dysphagia, rectal bleeding.    Gastroesophageal reflux disease  Consider esophagitis, esophageal stricture, gastritis, duodenitis PUD  - start 40 mg protonix daily  - schedule EGD    Follow up annually or as needed    Chronic constipation  Likely IBS-C  - start daily miralax    Rectal bleeding  Likely hemorrhoidal bleeding, consider IBD, r/o GI malignancy  - schedule colonoscopy         Elizabeth Atkinson, APRN-CNP

## 2024-11-18 NOTE — PATIENT INSTRUCTIONS
Please take Protonix 30-60 minutes before a meal daily.  This is to help calm stomach acid.   Try to minimize acidic foods such as citrus fruits, tomatoes, and pop. Also try to avoid chocolate, peppermint, alcohol, and caffeine.  Avoid eating within 2-3 hours of lying down.   Eat more frequent smaller meals instead of a few large meals.  You can prop the head of your bed up when you are sleeping to decrease reflux.  I recommend taking 1 capful of Miralax daily in 8 oz of liquid.  This is to help move bowels and soften stool.  Please schedule your upper endoscopy and colonoscopy. You will need a ride since this involves sedation.  I will send a bowel prep to your pharmacy.  Clear liquid diet the day before the procedure. Start the 1st part of the prep at 6 pm the night prior and the other half 5 hrs before the procedure.  Follow up annually or as needed

## 2024-11-18 NOTE — ASSESSMENT & PLAN NOTE
Consider esophagitis, esophageal stricture, gastritis, duodenitis PUD  - start 40 mg protonix daily  - schedule EGD    Follow up annually or as needed

## 2024-11-19 ENCOUNTER — APPOINTMENT (OUTPATIENT)
Dept: PHYSICAL THERAPY | Facility: CLINIC | Age: 54
End: 2024-11-19
Payer: COMMERCIAL

## 2024-11-25 ENCOUNTER — TREATMENT (OUTPATIENT)
Dept: PHYSICAL THERAPY | Facility: CLINIC | Age: 54
End: 2024-11-25
Payer: COMMERCIAL

## 2024-11-25 DIAGNOSIS — M54.32 LEFT SIDED SCIATICA: ICD-10-CM

## 2024-11-25 PROCEDURE — 97110 THERAPEUTIC EXERCISES: CPT | Mod: GP | Performed by: PHYSICAL THERAPIST

## 2024-11-25 NOTE — PROGRESS NOTES
"Physical Therapy Treatment    Patient Name: Milagros Parra  MRN: [id number]  Today's Date: 11/25/2024  Time Calculation  Start Time: 1617  Stop Time: 1657  Time Calculation (min): 40 min  PT Therapeutic Procedures Time Entry  Therapeutic Exercise Time Entry: 40    Insurance:  Visit number: 3 of 6  Authorization info: [id number]  Insurance Type: Payor: PORFIRIO / Plan: ANTHEM HMP / Product Type: *No Product type* /    Current Problem   1. Left sided sciatica  Follow Up In Physical Therapy          Subjective   General   Getting a little better, however left knee feels like it may give out at times. No current pain. \"I felt way better after I left last session.\"      Precautions: falls  Pain 0/10  Post Treatment Pain Level 0/10    Objective   Ascends and descends stairs with reciprocal pattern      Treatments:  Therapeutic Exercise:  NuStep L5 x6 minutes  Gastroc stretch at wedge, 30 seconds x 3  Stand hamstring stretch, 30 seconds x 3 ea R/L  Stand adductor stretch at step, 30 seconds x 3  Stand heel raises off step edge, 2x10  Stand hip ABD, 2x10 ea R/L  Mini squats, 2x10   FWD step ups 6inch 2x10   LAT step ups 6inch 2x10 ea R/L         Assessment   Assessment:   Improved stair negotiation without difficulty or buckling noted in LE. No UE support required for all exercises this date.       Plan: strength    OP EDUCATION: continue with HEP      Goals:   Active       Mobility       Goal 1       Start:  10/29/24    Expected End:  01/27/25       Pt will improve lumbar spine AROM to WNL to improve I/ADLs         Goal 2       Start:  10/29/24    Expected End:  01/27/25       Pt will improve LE flexibility to WNL to improve I/ADLs.             Pain       Goal 1       Start:  10/29/24    Expected End:  01/27/25       Pt will perform all work tasks with <3/10 pain         Goal 2       Start:  10/29/24    Expected End:  01/27/25       Pt will walk >1 mile for fundraising walks with <3/10 pain               "

## 2024-12-02 ENCOUNTER — TREATMENT (OUTPATIENT)
Dept: PHYSICAL THERAPY | Facility: CLINIC | Age: 54
End: 2024-12-02
Payer: COMMERCIAL

## 2024-12-02 DIAGNOSIS — M54.32 LEFT SIDED SCIATICA: ICD-10-CM

## 2024-12-02 PROCEDURE — 97110 THERAPEUTIC EXERCISES: CPT | Mod: GP | Performed by: PHYSICAL THERAPIST

## 2024-12-02 NOTE — PROGRESS NOTES
Physical Therapy Treatment    Patient Name: Milagros Parra  MRN: [id number]  Today's Date: 12/2/2024  Time Calculation  Start Time: 1615  Stop Time: 1655  Time Calculation (min): 40 min  PT Therapeutic Procedures Time Entry  Therapeutic Exercise Time Entry: 40    Insurance:  Visit number: 4 of 6  Authorization info: [id number]  Insurance Type: Payor: PORFIRIO / Plan: [employer]M HMP / Product Type: *No Product type* /    Current Problem   1. Left sided sciatica  Follow Up In Physical Therapy          Subjective   General   Did a lot of standing and cooking this week for [email or web address] and as a result has had a lot of left hip stiffness and pain. Now her left leg is tired and is extending into her groin.       Precautions: falls  Pain 6/10  Post Treatment Pain Level 6/10    Objective   Bilateral glute medius: 4-/5    Treatments:  Therapeutic Exercise:  NuStep L5 x6 minutes  Gastroc stretch at wedge, 30 seconds x 3  Stand hamstring stretch, 30 seconds x 3 ea R/L  Stand adductor stretch at step, 30 seconds x 3  Stand hip ABD, 2x10 ea R/L  Mini squats, 2x10   FWD step ups 6inch 2x10   LAT step ups 6inch 2x10 ea R/L   Side steps blue loop 20 feet x 4  Zig zags blue loop 20 feet x 4  Stand hip ext BTB 2x10 ea R/L   Stand hip flex BTB 2x10 ea R/L    Assessment   Assessment:   Able to progress to and for further LE strengthening. Cues required to prevent LE from ER due to overall glute weakness.       Plan: strengthening    OP EDUCATION: take rest breaks to off load right hip       Goals:   Active       Mobility       Goal 1       Start:  10/29/24    Expected End:  01/27/25       Pt will improve lumbar spine AROM to WNL to improve I/ADLs         Goal 2       Start:  10/29/24    Expected End:  01/27/25       Pt will improve LE flexibility to WNL to improve I/ADLs.             Pain       Goal 1       Start:  10/29/24    Expected End:  01/27/25       Pt will perform all work tasks with <3/10 pain         Goal 2       Start:  10/29/24     Expected End:  01/27/25       Pt will walk >1 mile for fundraising walks with <3/10 pain

## 2024-12-09 ENCOUNTER — DOCUMENTATION (OUTPATIENT)
Dept: PHYSICAL THERAPY | Facility: CLINIC | Age: 54
End: 2024-12-09

## 2024-12-09 ENCOUNTER — OFFICE VISIT (OUTPATIENT)
Dept: PRIMARY CARE | Facility: CLINIC | Age: 54
End: 2024-12-09
Payer: COMMERCIAL

## 2024-12-09 ENCOUNTER — APPOINTMENT (OUTPATIENT)
Dept: PHYSICAL THERAPY | Facility: CLINIC | Age: 54
End: 2024-12-09
Payer: COMMERCIAL

## 2024-12-09 VITALS
BODY MASS INDEX: 43.63 KG/M2 | TEMPERATURE: 97.3 F | HEART RATE: 76 BPM | OXYGEN SATURATION: 99 % | DIASTOLIC BLOOD PRESSURE: 82 MMHG | WEIGHT: 216 LBS | SYSTOLIC BLOOD PRESSURE: 134 MMHG

## 2024-12-09 DIAGNOSIS — E04.1 THYROID NODULE: Chronic | ICD-10-CM

## 2024-12-09 DIAGNOSIS — E11.9 DIABETES MELLITUS WITHOUT COMPLICATION (MULTI): ICD-10-CM

## 2024-12-09 DIAGNOSIS — E78.5 DYSLIPIDEMIA: ICD-10-CM

## 2024-12-09 DIAGNOSIS — K76.0 FATTY LIVER: Chronic | ICD-10-CM

## 2024-12-09 DIAGNOSIS — Z00.00 ANNUAL PHYSICAL EXAM: Primary | ICD-10-CM

## 2024-12-09 PROCEDURE — 99396 PREV VISIT EST AGE 40-64: CPT | Performed by: INTERNAL MEDICINE

## 2024-12-09 PROCEDURE — 3079F DIAST BP 80-89 MM HG: CPT | Performed by: INTERNAL MEDICINE

## 2024-12-09 PROCEDURE — 1036F TOBACCO NON-USER: CPT | Performed by: INTERNAL MEDICINE

## 2024-12-09 PROCEDURE — 3048F LDL-C <100 MG/DL: CPT | Performed by: INTERNAL MEDICINE

## 2024-12-09 PROCEDURE — 3051F HG A1C>EQUAL 7.0%<8.0%: CPT | Performed by: INTERNAL MEDICINE

## 2024-12-09 PROCEDURE — 3075F SYST BP GE 130 - 139MM HG: CPT | Performed by: INTERNAL MEDICINE

## 2024-12-09 PROCEDURE — 4010F ACE/ARB THERAPY RXD/TAKEN: CPT | Performed by: INTERNAL MEDICINE

## 2024-12-09 RX ORDER — METFORMIN HYDROCHLORIDE 750 MG/1
750 TABLET, EXTENDED RELEASE ORAL
Qty: 90 TABLET | Refills: 1 | Status: SHIPPED | OUTPATIENT
Start: 2024-12-09 | End: 2025-06-07

## 2024-12-09 ASSESSMENT — ENCOUNTER SYMPTOMS
CONSTIPATION: 1
BACK PAIN: 1
UNEXPECTED WEIGHT CHANGE: 0
SHORTNESS OF BREATH: 0
ABDOMINAL PAIN: 0
SINUS PRESSURE: 0
WHEEZING: 0
MYALGIAS: 0
FREQUENCY: 0
TREMORS: 0
DYSURIA: 0
NAUSEA: 0
FATIGUE: 0
POLYDIPSIA: 0
DEPRESSION: 0
BLOOD IN STOOL: 0
NUMBNESS: 0
TROUBLE SWALLOWING: 0
SEIZURES: 0
CHILLS: 0
LOSS OF SENSATION IN FEET: 0
COUGH: 0
PALPITATIONS: 0
POLYPHAGIA: 0
VOMITING: 0
OCCASIONAL FEELINGS OF UNSTEADINESS: 0

## 2024-12-09 ASSESSMENT — PATIENT HEALTH QUESTIONNAIRE - PHQ9
2. FEELING DOWN, DEPRESSED OR HOPELESS: NOT AT ALL
SUM OF ALL RESPONSES TO PHQ9 QUESTIONS 1 AND 2: 0
1. LITTLE INTEREST OR PLEASURE IN DOING THINGS: NOT AT ALL

## 2024-12-09 ASSESSMENT — PAIN SCALES - GENERAL: PAINLEVEL_OUTOF10: 0-NO PAIN

## 2024-12-09 NOTE — PROGRESS NOTES
Subjective   Patient ID: Milagros Parra is a 54 y.o. female who presents for Annual Exam.    HPI     She was a patient of Dr Dodd  She has history of hypertension, diabetes mellitus, CKD, S/P hysterectomy Jnauary 19th of 2023 with removal of right ovary    H/O DM- was started on Farxiga by her previous PCP, metformin was stopped ( no side effects)   However insurance did not cover Farxiga, restarted metformin today December 2024  Chronic constipation, taking miralax helps, per patient had colonoscopy in 2021  H/O thyroid cyst, last US was in 2022  Right biceps tendon repair in right shoulder, Dr Bernal in 2020  Left sided sciatic pain for past few weeks X 2 years  H/O Hypertension-denied any medication side effects.    Scheduled to get EGD and colonosocpy by GI in January 2025    Review of Systems   Constitutional:  Negative for chills, fatigue and unexpected weight change.   HENT:  Negative for postnasal drip, sinus pressure and trouble swallowing.    Respiratory:  Negative for cough, shortness of breath and wheezing.    Cardiovascular:  Negative for chest pain, palpitations and leg swelling.   Gastrointestinal:  Positive for constipation. Negative for abdominal pain, blood in stool, nausea and vomiting.   Endocrine: Negative for polydipsia, polyphagia and polyuria.   Genitourinary:  Negative for dysuria and frequency.   Musculoskeletal:  Positive for back pain. Negative for myalgias.   Skin:  Negative for rash.   Neurological:  Negative for tremors, seizures and numbness.   Psychiatric/Behavioral:  Negative for behavioral problems.        Objective   /82 (BP Location: Right arm, Patient Position: Sitting, BP Cuff Size: Adult)   Pulse 76   Temp 36.3 °C (97.3 °F) (Temporal)   Wt 98 kg (216 lb)   SpO2 99%   BMI 43.63 kg/m²     Physical Exam  Constitutional:       General: She is not in acute distress.     Appearance: Normal appearance.   HENT:      Head: Normocephalic and atraumatic.   Eyes:       Extraocular Movements: Extraocular movements intact.      Conjunctiva/sclera: Conjunctivae normal.      Pupils: Pupils are equal, round, and reactive to light.   Cardiovascular:      Rate and Rhythm: Normal rate and regular rhythm.      Heart sounds: Normal heart sounds. No murmur heard.     No friction rub.   Pulmonary:      Effort: Pulmonary effort is normal.      Breath sounds: Normal breath sounds. No wheezing or rales.   Abdominal:      General: Bowel sounds are normal.      Palpations: Abdomen is soft.      Tenderness: There is no abdominal tenderness. There is no guarding.   Musculoskeletal:         General: Normal range of motion.      Cervical back: Normal range of motion and neck supple.      Right lower leg: No edema.      Left lower leg: No edema.   Lymphadenopathy:      Cervical: No cervical adenopathy.   Neurological:      General: No focal deficit present.      Mental Status: She is alert and oriented to person, place, and time.      Cranial Nerves: No cranial nerve deficit.      Gait: Gait normal.   Psychiatric:         Mood and Affect: Mood normal.         Assessment/Plan        Milagros was seen today for annual exam.  Diagnoses and all orders for this visit:  Annual physical exam (Primary)  Diabetes mellitus without complication (Multi)  -     metFORMIN XR (Glucophage-XR) 750 mg 24 hr tablet; Take 1 tablet (750 mg) by mouth once daily in the evening. Take with meals. Do not crush, chew, or split.  -     Basic Metabolic Panel; Future  -     Hemoglobin A1C; Future  Dyslipidemia  Thyroid nodule  -     TSH with reflex to Free T4 if abnormal; Future  Fatty liver      Advised moderate intensity exercise 150 minutes/week  Advise low-carb diet  Started on metformin  Reviewed recent labs with patient    Past Medical History:   Diagnosis Date    Age-related nuclear cataract of both eyes 7/5/2024    Diabetes mellitus without complication (Multi) 7/5/2024    Low-tension glaucoma of left eye, mild stage 7/5/2024     Low-tension glaucoma of right eye, moderate stage 7/5/2024    Unspecified disorder of refraction 7/5/2024     Past Surgical History:   Procedure Laterality Date    HYSTERECTOMY         === 10/03/24 ===    US THYROID    - Impression -  Small left-sided thyroid nodule.    Please note that these statements are based on the recommendations of  the American College of Radiology    TI-RADS grading system. ACR TI-RADS recommendations (apply to nodules  which have NOT been biopsied):    TR5 (?7 points) highly suspicious - FNA if ? 1cm, follow-up if 0.5  -0.9 cm every year for 5 years. Aggregate cancer risk 35%. TR4 (4-6  points) moderately suspicious - FNA if ? 1.5cm, follow-up if 1 -1.4  cm in 1, 2, 3 and 5 years. Aggregate cancer risk 9.1% TR3 (3 points)  mildly suspicious - FNA if ? 2.5cm, follow-up if 1.5 -2.4 cm in 1, 3  and 5 years. Aggregate cancer risk 4.8% TR2 (2 points) not  suspicious. No FNA or follow-up.Aggregate cancer risk 1.5% TR1 (0  points) benign - No FNA or follow-up. Aggregate cancer risk 0.3%    Signed by: Filomena Gamboa 10/4/2024 9:59 PM  Dictation workstation:   [id number]      [facility]  Outside Information  Results  CT angio chest w and wo IV contrast (Order [id number])     CT angio chest w and wo IV contrast  Order: [id number]  Impression    IMPRESSION:  1.  Normal thoracoabdominal aorta.  2.  Hepatic steatosis  3.  Colonic diverticulosis          Transcribed Using Voice Recognition  Transcribe Date/Time: Aug 14 2024  9:55P    Dictated by: PATRICIA RIVERA MD    This examination was interpreted and the report reviewed and  electronically signed by:  PATRICIA RIVERA MD on Aug 14 2024 10:03PM  EST  Narrative    * * *Final Report* * *    DATE OF EXAM: Aug 14 2024  9:29PM      [facility]   0126  -  CTA CHEST (GATED) WO/W IVCON  / ACCESSION #  [id number]    PROCEDURE REASON: Aortic dissection suspected        * * * * Physician Interpretation * * * *    RESULT: EXAMINATION:  CTA CHEST (GATED)  WO/W IVCON, CTA ABD/PEL W IVCON    CLINICAL HISTORY: Pain Acute Dissection    Technique:   --  3D MIP image were created, reviewed and archived .    Exam Date:  8/14/2024 9:29 PM  Comparison: Chest radiograph 08/14/2024, CT abdomen and pelvis 11/27/2020    Contrast:   ml of Omnipaque 350    CT Radiation dose: Integrated Dose-length product (DLP) for this visit =    2047 mGy*cm  CT Dose Reduction Employed: Automated exposure control(AEC) and iterative  recon      RESULT:  No cardiomegaly or pericardial effusion.  Lungs are clear.  No  mediastinal, hilar lymphadenopathy.  Normal thoracoabdominal aorta without aneurysm, dissection, penetrating  atheromatous ulceration.  No pulmonary embolus.  Patent mesenteric, renal  arteries.  Patent branch iliac arteries.  Patent great vessels.    Hepatic steatosis. No radiopaque gallstones.  Normal appendix.  Colonic  diverticulosis.  Normal kidneys, adrenal glands, pancreas, spleen,  stomach, urinary bladder.  No suspicious osseous lesion.  Right  perivaginal cyst unchanged  Exam End: 08/14/24 21:29    Specimen Collected: 08/14/24 21:29      US thyroid  Order: [id number]  Narrative    PERFORMED AT [facility] LOCATION:[address]      Date of Service: 03/11/2022 18:53 Adm #: [id number]  Reading Dr:MARYCRUZ MORELOS Signoff Dr: MARYCRUZ MORELOS  PROCEDURE:     THYROID - WUS  2689  REASON FOR EXAM: E04.1 NONTOXIC SINGLE THYROID NODULE  RESULT: Clinical Information: Follow-up thyroid cyst.  Comparison study: 2/27/2020.  Real-time ultrasound of the thyroid gland was performed.  The right lobe measures 4.2 x 1.2 x 1.3 cm.   The left lobe measures 4.4 x 0.9 x 1.7 cm.   The thyroid isthmus measures 0.1 cm in thickness.  There is redemonstration of a small cyst in the upper pole of the left  thyroid lobe measuring 4 x 2 x 3 mm   similar to the prior exam. No new  lesions are identified. On Doppler ultrasound there is symmetric  bilateral blood flow.  Impression: Stable minute colloid  cyst in the upper pole of the left  thyroid lobe.  [id number]  This report has been produced using speech recognition.  Original Interpreting Physician:   MARYCRUZ MORELOS M.D.  Original Transcribed by/Date: PSCB   Mar 12 2022 11:08A  Original Electronically Signed by/Date: MARYCRUZ MORELOS M.D. Mar 12 2022  11:08A  Addendum Interpreting Physician:  Addendum Transcribed by/Date:   NO ADDENDUM  Addendum Electronically Signed by/Date:  Exam End: --    Specimen Collected: 03/11/22 Last Resulted: 03/11/22 00:00   Received From: [facility]  Result Received: 07/05/24 13:43      Current Outpatient Medications   Medication Instructions    cyanocobalamin, vitamin B-12, (Vitamin B-12) 1,000 mcg tablet extended release 1 tablet Orally three days a week    diphenhydrAMINE (SOMINEX) 25 mg, oral, Every 6 hours    ergocalciferol (VITAMIN D-2) 50,000 Units, oral, Weekly    latanoprost (Xalatan) 0.005 % ophthalmic solution 1 drop, Both Eyes, Nightly    losartan (COZAAR) 50 mg, oral, Every 24 hours    lovastatin (MEVACOR) 20 mg, oral, Nightly    metFORMIN XR (GLUCOPHAGE-XR) 750 mg, oral, Daily with evening meal, Do not crush, chew, or split.    pantoprazole (PROTONIX) 40 mg, oral, Daily, Do not crush, chew, or split. Take 30 minutes before a meal    polyethylene glycol (MIRALAX) 17 g, oral, Daily, Mix 1 capful in 8 oz of liquid

## 2024-12-09 NOTE — PROGRESS NOTES
Physical Therapy                 Therapy Communication Note    Patient Name: Milagros Parra  MRN: [id number]  Department:   Room: Room/bed info not found  Today's Date: 12/9/2024     Discipline: Physical Therapy    Missed Time: Cancel    Comment: Pt called stating she would be late and not able to make appointment on time. She rescheduled to 12/30/2024 instead.

## 2024-12-19 ENCOUNTER — OFFICE VISIT (OUTPATIENT)
Dept: URGENT CARE | Age: 54
End: 2024-12-19
Payer: COMMERCIAL

## 2024-12-19 ENCOUNTER — ANCILLARY PROCEDURE (OUTPATIENT)
Dept: URGENT CARE | Age: 54
End: 2024-12-19
Payer: COMMERCIAL

## 2024-12-19 VITALS
HEART RATE: 64 BPM | OXYGEN SATURATION: 98 % | DIASTOLIC BLOOD PRESSURE: 85 MMHG | RESPIRATION RATE: 18 BRPM | SYSTOLIC BLOOD PRESSURE: 134 MMHG | TEMPERATURE: 97.2 F

## 2024-12-19 DIAGNOSIS — S69.91XA HAND INJURY, RIGHT, INITIAL ENCOUNTER: ICD-10-CM

## 2024-12-19 DIAGNOSIS — S69.91XA HAND INJURY, RIGHT, INITIAL ENCOUNTER: Primary | ICD-10-CM

## 2024-12-19 PROCEDURE — 73130 X-RAY EXAM OF HAND: CPT | Mod: RIGHT SIDE | Performed by: PHYSICIAN ASSISTANT

## 2024-12-19 NOTE — PROGRESS NOTES
Subjective   Patient ID: Milagros Parra is a 54 y.o. female. They present today with a chief complaint of Finger Pain (Yesterday x R hand/ index finger jammed/Pain with movement ).    History of Present Illness  Patient is a pleasant 54-year-old -American female, past medical history of diabetes, presented to clinic with chief complaint of right hand injury.  Patient that she was cleaning her sink yesterday when she wax the dorsum of the right hand over the second metacarpal bone.  She is going to the clinic out of concern for pain and swelling over this area.  She states it just feels tight when she moves her fingers.  She denies any palpable pain.  She denies any bruising.  Has been using Tylenol and Profen at home with only mild short-term relief.  Denies any bleeding.  No other injuries.  Denies any pain in the wrist.          Past Medical History  Allergies as of 12/19/2024    (No Known Allergies)       (Not in a hospital admission)         Past Medical History:   Diagnosis Date    Age-related nuclear cataract of both eyes 7/5/2024    Diabetes mellitus without complication (Multi) 7/5/2024    Low-tension glaucoma of left eye, mild stage 7/5/2024    Low-tension glaucoma of right eye, moderate stage 7/5/2024    Unspecified disorder of refraction 7/5/2024       Past Surgical History:   Procedure Laterality Date    HYSTERECTOMY          reports that she has never smoked. She has never used smokeless tobacco. She reports that she does not currently use alcohol. She reports that she does not use drugs.    Review of Systems  Review of Systems                               Objective    Vitals:    12/19/24 1837   BP: 134/85   Pulse: 64   Resp: 18   Temp: 36.2 °C (97.2 °F)   SpO2: 98%     No LMP recorded. Patient has had a hysterectomy.    Physical Exam  General: Vitals noted, no distress. Afebrile.     EENT: TMs clear. Eyes unremarkable. Posterior oropharynx unremarkable.   Cardiac: Regular, rate, rhythm, no  murmur.     Pulmonary: Lungs clear bilaterally with good aeration. No adventitious breath sounds.     Abdomen: Soft, nontender, nonsurgical. No peritoneal signs. Normoactive bowel sounds.     Extremities: There is tenderness to palpation over the right distal second metacarpal bone with a small amount of overlying swelling and a small amount of ecchymosis.  Is neurovascular intact distally.  Has full strength with flexion extension of all digits.  No peripheral edema. Negative Homans bilaterally, no cords. Is nontender over the anatomic snuffbox. Has no pain with axial loading of thumb. The skin is intact. Is neurovascularly intact distally. The remainder of the extremity is nontender.     Skin: No rash.     Neuro: No focal neurologic deficits  Procedures    Point of Care Test & Imaging Results from this visit    No results found.    Diagnostic study results (if any) were reviewed by Guy Ferrell PA-C.    Assessment/Plan   Allergies, medications, history, and pertinent labs/EKGs/Imaging reviewed by Guy Ferrell PA-C.     Medical Decision Making  Patient was seen eval in the clinic with complaint of right hand injury.  On exam patient is nontoxic very well-appearing resting bed comfortably no acute distress.  Vital signs are stable, afebrile.  Chest is clear, heart is regular, belly is soft and nontender for evaluation of right hand as above concerning for a soft tissue contusion versus underlying fracture.  X-ray imaging of the right hand was obtained which reveals no underlying acute osseous normalities.  Will advise supportive care measures at home including rest, ice, elevation, ibuprofen and Tylenol for 6 hours as needed for pain.  Advised to follow close with her primary care physician in the next week.  Reviewed my impression, plan, strict return precautions with the patient.  She expresses understanding and agreement plan of care.    Orders and Diagnoses  Diagnoses and all orders for this  visit:  Hand injury, right, initial encounter  -     XR hand right 3+ views; Future        Medical Admin Record      Follow Up Instructions  No follow-ups on file.    Patient disposition: Home    Electronically signed by Guy Ferrell PA-C  6:54 PM

## 2024-12-30 ENCOUNTER — APPOINTMENT (OUTPATIENT)
Dept: PHYSICAL THERAPY | Facility: CLINIC | Age: 54
End: 2024-12-30
Payer: COMMERCIAL

## 2024-12-30 DIAGNOSIS — M54.32 LEFT SIDED SCIATICA: ICD-10-CM

## 2024-12-30 PROCEDURE — 97110 THERAPEUTIC EXERCISES: CPT | Mod: GP | Performed by: PHYSICAL THERAPIST

## 2024-12-30 NOTE — PROGRESS NOTES
"Physical Therapy Treatment    Patient Name: Milagros Parra  MRN: [id number]  Today's Date: 12/30/2024  Time Calculation  Start Time: 1632  Stop Time: 1712  Time Calculation (min): 40 min  PT Therapeutic Procedures Time Entry  Therapeutic Exercise Time Entry: 40    Insurance:  Visit number: 5 of 6  Authorization info: [id number]  Insurance Type: Payor: ANTHEM / Plan: ANTHEM HMP / Product Type: *No Product type* /    Current Problem   1. Left sided sciatica  Follow Up In Physical Therapy          Subjective   General   Patient thinks she has a cyst which is causing the discomfort in her left hip. She will be going to the MD next week to determine.       Precautions: none  Pain 8/10  Post Treatment Pain Level \"able to move better\"    Objective   Left hip flexion: 4+/5 , no crepitus in all planes     Treatments:  Therapeutic Exercise:  NuStep L5 x6 minutes  Gastroc stretch at wedge, 30 seconds x 3  Stand hamstring stretch, 30 seconds x 3 ea R/L  Stand adductor stretch at step, 30 seconds x 3  Heel raises off edge 2x10  Mini squats, 2x10   FWD step ups 6inch 2x10   LAT step ups 6inch 2x10 ea R/L  Side steps blue loop 20 feet x 4  Zig zags blue loop 20 feet x 4  Stand hip ext BTB 2x10 ea R/L   Stand hip flex BTB 2x10 ea R/L        Assessment   Assessment:   Despite continued pain, patient demonstrating improved left hip strength. Able to perform all exercises in WB and closed chain without requiring seated rest breaks.       Plan: reassess next session      OP EDUCATION: return to physician for further testing as needed       Goals:   Active       Mobility       Goal 1       Start:  10/29/24    Expected End:  01/27/25       Pt will improve lumbar spine AROM to WNL to improve I/ADLs         Goal 2       Start:  10/29/24    Expected End:  01/27/25       Pt will improve LE flexibility to WNL to improve I/ADLs.             Pain       Goal 1       Start:  10/29/24    Expected End:  01/27/25       Pt will perform all work tasks with " <3/10 pain         Goal 2       Start:  10/29/24    Expected End:  01/27/25       Pt will walk >1 mile for fundraising walks with <3/10 pain

## 2025-01-09 ENCOUNTER — APPOINTMENT (OUTPATIENT)
Dept: PRIMARY CARE | Facility: CLINIC | Age: 55
End: 2025-01-09
Payer: COMMERCIAL

## 2025-01-09 ENCOUNTER — APPOINTMENT (OUTPATIENT)
Dept: OPHTHALMOLOGY | Facility: CLINIC | Age: 55
End: 2025-01-09
Payer: COMMERCIAL

## 2025-01-09 DIAGNOSIS — H40.1221 LOW-TENSION GLAUCOMA OF LEFT EYE, MILD STAGE: ICD-10-CM

## 2025-01-09 DIAGNOSIS — H40.1212 LOW-TENSION GLAUCOMA OF RIGHT EYE, MODERATE STAGE: Primary | ICD-10-CM

## 2025-01-09 PROCEDURE — 92083 EXTENDED VISUAL FIELD XM: CPT | Performed by: OPHTHALMOLOGY

## 2025-01-09 PROCEDURE — 99213 OFFICE O/P EST LOW 20 MIN: CPT | Performed by: OPHTHALMOLOGY

## 2025-01-09 ASSESSMENT — REFRACTION_WEARINGRX
OS_CYLINDER: -0.25
OD_AXIS: 081
OD_CYLINDER: -0.25
OD_SPHERE: -1.25
OS_AXIS: 047
OS_SPHERE: -1.00
SPECS_TYPE: SVL

## 2025-01-09 ASSESSMENT — PACHYMETRY
OS_CT(UM): 519
OD_CT(UM): 505

## 2025-01-09 ASSESSMENT — EXTERNAL EXAM - RIGHT EYE: OD_EXAM: NORMAL

## 2025-01-09 ASSESSMENT — ENCOUNTER SYMPTOMS
RESPIRATORY NEGATIVE: 0
CONSTITUTIONAL NEGATIVE: 0
PSYCHIATRIC NEGATIVE: 0
CARDIOVASCULAR NEGATIVE: 0
NEUROLOGICAL NEGATIVE: 0
HEMATOLOGIC/LYMPHATIC NEGATIVE: 0
EYES NEGATIVE: 0
GASTROINTESTINAL NEGATIVE: 0
MUSCULOSKELETAL NEGATIVE: 0
ALLERGIC/IMMUNOLOGIC NEGATIVE: 0
ENDOCRINE NEGATIVE: 0

## 2025-01-09 ASSESSMENT — VISUAL ACUITY
METHOD: SNELLEN - LINEAR
CORRECTION_TYPE: GLASSES
OD_CC+: -2
OS_CC: 20/20
OD_CC: 20/20

## 2025-01-09 ASSESSMENT — TONOMETRY
IOP_METHOD: GOLDMANN APPLANATION
OD_IOP_MMHG: 13
OS_IOP_MMHG: 12

## 2025-01-09 ASSESSMENT — PAIN SCALES - GENERAL: PAINLEVEL_OUTOF10: 0-NO PAIN

## 2025-01-09 ASSESSMENT — EXTERNAL EXAM - LEFT EYE: OS_EXAM: NORMAL

## 2025-01-09 ASSESSMENT — SLIT LAMP EXAM - LIDS
COMMENTS: NORMAL
COMMENTS: NORMAL

## 2025-01-09 NOTE — PROGRESS NOTES
Assessment/Plan   Problem List Items Addressed This Visit       Low-tension glaucoma of right eye, moderate stage - Primary     Stable ? Para central inferior changes right eye (OD), no obvious progression with time. Good response back on latanoprost with few points of IOP lowering. Will continue to monitor over time with alternating testing, due in 6 months for full with OCT nerve.          Relevant Orders    Gonzalez Visual Field - OU - Both Eyes (Completed)    Low-tension glaucoma of left eye, mild stage     As per right eye (OD) assessment, no obvious glaucomatous field loss on testing today and good response with latanoprost.          Relevant Orders    Gonzalez Visual Field - OU - Both Eyes (Completed)       Provided reassurance regarding above diagnoses and care received in the office visit today. Discussed outcomes and options along with the importance of treatment compliance. Understands the importance of any follow up visits. Patient instructed to call/communicate with our office if any new issues, questions, or concerns.     Will plan to see back in 6 months for full with OCT nerve or sooner PRN

## 2025-01-09 NOTE — ASSESSMENT & PLAN NOTE
As per right eye (OD) assessment, no obvious glaucomatous field loss on testing today and good response with latanoprost.

## 2025-01-09 NOTE — ASSESSMENT & PLAN NOTE
Stable ? Para central inferior changes right eye (OD), no obvious progression with time. Good response back on latanoprost with few points of IOP lowering. Will continue to monitor over time with alternating testing, due in 6 months for full with OCT nerve.

## 2025-01-09 NOTE — PATIENT INSTRUCTIONS
Thank you so much for choosing me to provide your care today!    If you were dilated your vision may remain blurry   or light sensitive for several hours.    The nature of eye and vision problems can require frequent follow up, please make every effort to adhere to any future appointments.    If you have any issues, questions, or concerns,   please do not hesitate to reach out.    If you receive a survey in regards to your care today, please mention any exceptional care my office staff and/or technicians provided.    You can reach our office at this number:    [phone]    Please consider signing up for and utilizing [employer]!  This is the best way to directly reach me or other  providers

## 2025-01-15 ENCOUNTER — APPOINTMENT (OUTPATIENT)
Dept: GASTROENTEROLOGY | Facility: EXTERNAL LOCATION | Age: 55
End: 2025-01-15
Payer: COMMERCIAL

## 2025-01-21 ENCOUNTER — APPOINTMENT (OUTPATIENT)
Dept: GASTROENTEROLOGY | Facility: EXTERNAL LOCATION | Age: 55
End: 2025-01-21
Payer: COMMERCIAL

## 2025-01-21 DIAGNOSIS — R14.0 GASTRIC TYMPANY: ICD-10-CM

## 2025-01-21 DIAGNOSIS — K62.5 RECTAL BLEEDING: ICD-10-CM

## 2025-01-21 DIAGNOSIS — K21.9 GASTROESOPHAGEAL REFLUX DISEASE, UNSPECIFIED WHETHER ESOPHAGITIS PRESENT: Primary | ICD-10-CM

## 2025-01-21 DIAGNOSIS — K21.9 GASTROESOPHAGEAL REFLUX DISEASE, UNSPECIFIED WHETHER ESOPHAGITIS PRESENT: ICD-10-CM

## 2025-01-21 DIAGNOSIS — R13.19 ESOPHAGEAL DYSPHAGIA: ICD-10-CM

## 2025-01-21 DIAGNOSIS — K44.9 HIATAL HERNIA: ICD-10-CM

## 2025-01-21 DIAGNOSIS — K59.00 CONSTIPATION, UNSPECIFIED CONSTIPATION TYPE: ICD-10-CM

## 2025-01-21 PROCEDURE — 45378 DIAGNOSTIC COLONOSCOPY: CPT | Performed by: INTERNAL MEDICINE

## 2025-01-21 PROCEDURE — 4010F ACE/ARB THERAPY RXD/TAKEN: CPT | Performed by: INTERNAL MEDICINE

## 2025-01-21 PROCEDURE — 88305 TISSUE EXAM BY PATHOLOGIST: CPT

## 2025-01-21 PROCEDURE — 0753T DGTZ GLS MCRSCP SLD LEVEL IV: CPT

## 2025-01-21 PROCEDURE — 43239 EGD BIOPSY SINGLE/MULTIPLE: CPT | Performed by: INTERNAL MEDICINE

## 2025-01-21 PROCEDURE — 88305 TISSUE EXAM BY PATHOLOGIST: CPT | Performed by: PATHOLOGY

## 2025-01-22 ENCOUNTER — LAB REQUISITION (OUTPATIENT)
Dept: LAB | Facility: HOSPITAL | Age: 55
End: 2025-01-22
Payer: COMMERCIAL

## 2025-01-27 LAB
LABORATORY COMMENT REPORT: NORMAL
PATH REPORT.FINAL DX SPEC: NORMAL
PATH REPORT.GROSS SPEC: NORMAL
PATH REPORT.RELEVANT HX SPEC: NORMAL
PATH REPORT.TOTAL CANCER: NORMAL

## 2025-01-29 PROCEDURE — 88175 CYTOPATH C/V AUTO FLUID REDO: CPT

## 2025-01-29 PROCEDURE — 87624 HPV HI-RISK TYP POOLED RSLT: CPT

## 2025-01-31 ENCOUNTER — LAB REQUISITION (OUTPATIENT)
Dept: LAB | Facility: HOSPITAL | Age: 55
End: 2025-01-31
Payer: COMMERCIAL

## 2025-01-31 DIAGNOSIS — Z01.419 ENCOUNTER FOR GYNECOLOGICAL EXAMINATION (GENERAL) (ROUTINE) WITHOUT ABNORMAL FINDINGS: ICD-10-CM

## 2025-01-31 DIAGNOSIS — Z11.51 ENCOUNTER FOR SCREENING FOR HUMAN PAPILLOMAVIRUS (HPV): ICD-10-CM

## 2025-02-06 ENCOUNTER — DOCUMENTATION (OUTPATIENT)
Dept: PHYSICAL THERAPY | Facility: CLINIC | Age: 55
End: 2025-02-06
Payer: COMMERCIAL

## 2025-03-18 DIAGNOSIS — I10 ESSENTIAL HYPERTENSION: ICD-10-CM

## 2025-03-18 DIAGNOSIS — E78.5 DYSLIPIDEMIA: ICD-10-CM

## 2025-03-18 RX ORDER — LOVASTATIN 20 MG/1
20 TABLET ORAL NIGHTLY
Qty: 90 TABLET | Refills: 1 | Status: SHIPPED | OUTPATIENT
Start: 2025-03-18

## 2025-03-18 RX ORDER — LOSARTAN POTASSIUM 50 MG/1
50 TABLET ORAL EVERY 24 HOURS
Qty: 90 TABLET | Refills: 1 | Status: SHIPPED | OUTPATIENT
Start: 2025-03-18

## 2025-04-01 ENCOUNTER — TELEPHONE (OUTPATIENT)
Dept: SURGERY | Facility: CLINIC | Age: 55
End: 2025-04-01
Payer: COMMERCIAL

## 2025-04-01 NOTE — TELEPHONE ENCOUNTER
Patient left voicemail through [facility] box letting us know that she completed info and is interested in scheduling.    Called patient back and left a voicemail letting her know that her insurance has been verified and she does not have bariatric coverage through ins that she may want to reach out to PCP regarding weight loss medication.

## 2025-06-13 DIAGNOSIS — E11.9 DIABETES MELLITUS WITHOUT COMPLICATION: ICD-10-CM

## 2025-06-13 RX ORDER — METFORMIN HYDROCHLORIDE 750 MG/1
TABLET, EXTENDED RELEASE ORAL
Qty: 90 TABLET | Refills: 0 | Status: SHIPPED | OUTPATIENT
Start: 2025-06-13

## 2025-07-01 ENCOUNTER — OFFICE VISIT (OUTPATIENT)
Dept: PRIMARY CARE | Facility: CLINIC | Age: 55
End: 2025-07-01
Payer: COMMERCIAL

## 2025-07-01 ENCOUNTER — PHARMACY VISIT (OUTPATIENT)
Dept: PHARMACY | Facility: CLINIC | Age: 55
End: 2025-07-01
Payer: MEDICARE

## 2025-07-01 VITALS
DIASTOLIC BLOOD PRESSURE: 70 MMHG | SYSTOLIC BLOOD PRESSURE: 124 MMHG | WEIGHT: 211 LBS | BODY MASS INDEX: 42.62 KG/M2 | OXYGEN SATURATION: 98 % | TEMPERATURE: 97.7 F | HEART RATE: 60 BPM

## 2025-07-01 DIAGNOSIS — E11.9 TYPE 2 DIABETES MELLITUS WITHOUT COMPLICATION, WITHOUT LONG-TERM CURRENT USE OF INSULIN: ICD-10-CM

## 2025-07-01 DIAGNOSIS — E04.1 THYROID NODULE: ICD-10-CM

## 2025-07-01 DIAGNOSIS — E78.5 DYSLIPIDEMIA: ICD-10-CM

## 2025-07-01 DIAGNOSIS — I10 ESSENTIAL HYPERTENSION: Primary | ICD-10-CM

## 2025-07-01 DIAGNOSIS — E55.9 VITAMIN D DEFICIENCY: ICD-10-CM

## 2025-07-01 DIAGNOSIS — Z12.31 SCREENING MAMMOGRAM FOR BREAST CANCER: ICD-10-CM

## 2025-07-01 LAB — POC HEMOGLOBIN A1C: 7.3 % (ref 4.2–6.5)

## 2025-07-01 PROCEDURE — 4010F ACE/ARB THERAPY RXD/TAKEN: CPT | Performed by: INTERNAL MEDICINE

## 2025-07-01 PROCEDURE — 3074F SYST BP LT 130 MM HG: CPT | Performed by: INTERNAL MEDICINE

## 2025-07-01 PROCEDURE — 1036F TOBACCO NON-USER: CPT | Performed by: INTERNAL MEDICINE

## 2025-07-01 PROCEDURE — RXMED WILLOW AMBULATORY MEDICATION CHARGE

## 2025-07-01 PROCEDURE — 3078F DIAST BP <80 MM HG: CPT | Performed by: INTERNAL MEDICINE

## 2025-07-01 PROCEDURE — 83036 HEMOGLOBIN GLYCOSYLATED A1C: CPT | Performed by: INTERNAL MEDICINE

## 2025-07-01 PROCEDURE — 99214 OFFICE O/P EST MOD 30 MIN: CPT | Performed by: INTERNAL MEDICINE

## 2025-07-01 PROCEDURE — 3051F HG A1C>EQUAL 7.0%<8.0%: CPT | Performed by: INTERNAL MEDICINE

## 2025-07-01 RX ORDER — TIRZEPATIDE 2.5 MG/.5ML
2.5 INJECTION, SOLUTION SUBCUTANEOUS WEEKLY
Qty: 2 ML | Refills: 0 | Status: SHIPPED | OUTPATIENT
Start: 2025-07-01

## 2025-07-01 RX ORDER — ASPIRIN 81 MG/1
81 TABLET ORAL DAILY
COMMUNITY

## 2025-07-01 ASSESSMENT — ENCOUNTER SYMPTOMS
SINUS PRESSURE: 0
NUMBNESS: 0
VOMITING: 0
OCCASIONAL FEELINGS OF UNSTEADINESS: 0
ABDOMINAL PAIN: 0
DYSURIA: 0
CONSTIPATION: 1
PALPITATIONS: 0
NAUSEA: 0
SHORTNESS OF BREATH: 0
BACK PAIN: 1
CHILLS: 0
WHEEZING: 0
TREMORS: 0
DEPRESSION: 0
COUGH: 0
MYALGIAS: 0
SEIZURES: 0
TROUBLE SWALLOWING: 0
UNEXPECTED WEIGHT CHANGE: 0
FREQUENCY: 0
FATIGUE: 0
POLYPHAGIA: 0
POLYDIPSIA: 0
LOSS OF SENSATION IN FEET: 0
BLOOD IN STOOL: 0

## 2025-07-01 ASSESSMENT — PAIN SCALES - GENERAL: PAINLEVEL_OUTOF10: 0-NO PAIN

## 2025-07-01 NOTE — PROGRESS NOTES
Subjective   Patient ID: Milagros Parra is a 54 y.o. female who presents for Follow-up (6mon follow up).    HPI     She was a patient of Dr Dodd  She has history of hypertension, diabetes mellitus, CKD, S/P hysterectomy Jnauary 19th of 2023 with removal of right ovary    H/O DM- was started on Farxiga by her previous PCP, metformin was stopped ( no side effects)   However insurance did not cover Farxiga, restarted metformin in December 2024  Chronic constipation, taking miralax helps, per patient had colonoscopy in January of 2025   H/O thyroid cyst, last US was in 2022  Right biceps tendon repair in right shoulder, Dr Bernal in 2020  Left sided sciatic pain for past few weeks X 2 years  H/O Hypertension-denied any medication side effects.        Review of Systems   Constitutional:  Negative for chills, fatigue and unexpected weight change.   HENT:  Negative for postnasal drip, sinus pressure and trouble swallowing.    Respiratory:  Negative for cough, shortness of breath and wheezing.    Cardiovascular:  Negative for chest pain, palpitations and leg swelling.   Gastrointestinal:  Positive for constipation. Negative for abdominal pain, blood in stool, nausea and vomiting.   Endocrine: Negative for polydipsia, polyphagia and polyuria.   Genitourinary:  Negative for dysuria and frequency.   Musculoskeletal:  Positive for back pain. Negative for myalgias.   Skin:  Negative for rash.   Neurological:  Negative for tremors, seizures and numbness.   Psychiatric/Behavioral:  Negative for behavioral problems.        Objective   /70 (BP Location: Left arm, Patient Position: Sitting, BP Cuff Size: Adult)   Pulse 60   Temp 36.5 °C (97.7 °F) (Temporal)   Wt 95.7 kg (211 lb)   SpO2 98%   BMI 42.62 kg/m²     Physical Exam  Constitutional:       General: She is not in acute distress.     Appearance: Normal appearance.   HENT:      Head: Normocephalic and atraumatic.   Eyes:      Extraocular Movements: Extraocular  movements intact.      Conjunctiva/sclera: Conjunctivae normal.      Pupils: Pupils are equal, round, and reactive to light.   Cardiovascular:      Rate and Rhythm: Normal rate and regular rhythm.      Heart sounds: Normal heart sounds. No murmur heard.     No friction rub.   Pulmonary:      Effort: Pulmonary effort is normal.      Breath sounds: Normal breath sounds. No wheezing or rales.   Abdominal:      General: Bowel sounds are normal.      Palpations: Abdomen is soft.      Tenderness: There is no abdominal tenderness. There is no guarding.   Musculoskeletal:         General: Normal range of motion.      Cervical back: Normal range of motion and neck supple.      Right lower leg: No edema.      Left lower leg: No edema.   Lymphadenopathy:      Cervical: No cervical adenopathy.   Neurological:      General: No focal deficit present.      Mental Status: She is alert and oriented to person, place, and time.      Cranial Nerves: No cranial nerve deficit.      Gait: Gait normal.   Psychiatric:         Mood and Affect: Mood normal.         Assessment/Plan        Milagros was seen today for follow-up.  Diagnoses and all orders for this visit:  Essential hypertension (Primary)  -     CBC and Auto Differential; Future  -     Comprehensive Metabolic Panel; Future  -     CBC and Auto Differential  -     Comprehensive Metabolic Panel  Type 2 diabetes mellitus without complication, without long-term current use of insulin  -     POCT glycosylated hemoglobin (Hb A1C) manually resulted  -     Albumin-Creatinine Ratio, Urine Random  -     tirzepatide (Mounjaro) 2.5 mg/0.5 mL pen injector; Inject 2.5 mg under the skin 1 (one) time per week.  Dyslipidemia  -     Lipid Panel; Future  -     Lipid Panel  Thyroid nodule  -     TSH with reflex to Free T4 if abnormal; Future  -     TSH with reflex to Free T4 if abnormal  Vitamin D deficiency  -     Vitamin D 25-Hydroxy,Total (for eval of Vitamin D levels); Future  -     Vitamin D  25-Hydroxy,Total (for eval of Vitamin D levels)  Screening mammogram for breast cancer  -     BI mammo bilateral screening tomosynthesis; Future      Advised moderate intensity exercise 150 minutes/week  Advise low-carb diet  Started on Mounjaro, discussed medication side effects      Past Medical History:   Diagnosis Date    Age-related nuclear cataract of both eyes 2024    Diabetes mellitus without complication 2024    GERD (gastroesophageal reflux disease)     Hypertension     Low-tension glaucoma of left eye, mild stage 2024    Low-tension glaucoma of right eye, moderate stage 2024    Unspecified disorder of refraction 2024     Past Surgical History:   Procedure Laterality Date     SECTION, LOW TRANSVERSE      HYSTERECTOMY  2023       === 10/03/24 ===    US THYROID    - Impression -  Small left-sided thyroid nodule.    Please note that these statements are based on the recommendations of  the American College of Radiology    TI-RADS grading system. ACR TI-RADS recommendations (apply to nodules  which have NOT been biopsied):    TR5 (?7 points) highly suspicious - FNA if ? 1cm, follow-up if 0.5  -0.9 cm every year for 5 years. Aggregate cancer risk 35%. TR4 (4-6  points) moderately suspicious - FNA if ? 1.5cm, follow-up if 1 -1.4  cm in 1, 2, 3 and 5 years. Aggregate cancer risk 9.1% TR3 (3 points)  mildly suspicious - FNA if ? 2.5cm, follow-up if 1.5 -2.4 cm in 1, 3  and 5 years. Aggregate cancer risk 4.8% TR2 (2 points) not  suspicious. No FNA or follow-up.Aggregate cancer risk 1.5% TR1 (0  points) benign - No FNA or follow-up. Aggregate cancer risk 0.3%    Signed by: Filomena Gamboa 10/4/2024 9:59 PM  Dictation workstation:   [id number]      [facility]  Outside Information  Results  CT angio chest w and wo IV contrast (Order [id number])     CT angio chest w and wo IV contrast  Order: [id number]  Impression    IMPRESSION:  1.  Normal thoracoabdominal aorta.  2.  Hepatic  steatosis  3.  Colonic diverticulosis          Transcribed Using Voice Recognition  Transcribe Date/Time: Aug 14 2024  9:55P    Dictated by: PATRICIA RIVERA MD    This examination was interpreted and the report reviewed and  electronically signed by:  PATRICIA RIVERA MD on Aug 14 2024 10:03PM  EST  Narrative    * * *Final Report* * *    DATE OF EXAM: Aug 14 2024  9:29PM      EUC   0126  -  CTA CHEST (GATED) WO/W IVCON  / ACCESSION #  [id number]    PROCEDURE REASON: Aortic dissection suspected        * * * * Physician Interpretation * * * *    RESULT: EXAMINATION:  CTA CHEST (GATED) WO/W IVCON, CTA ABD/PEL W IVCON    CLINICAL HISTORY: Pain Acute Dissection    Technique:   --  3D MIP image were created, reviewed and archived .    Exam Date:  8/14/2024 9:29 PM  Comparison: Chest radiograph 08/14/2024, CT abdomen and pelvis 11/27/2020    Contrast:   ml of Omnipaque 350    CT Radiation dose: Integrated Dose-length product (DLP) for this visit =    2047 mGy*cm  CT Dose Reduction Employed: Automated exposure control(AEC) and iterative  recon      RESULT:  No cardiomegaly or pericardial effusion.  Lungs are clear.  No  mediastinal, hilar lymphadenopathy.  Normal thoracoabdominal aorta without aneurysm, dissection, penetrating  atheromatous ulceration.  No pulmonary embolus.  Patent mesenteric, renal  arteries.  Patent branch iliac arteries.  Patent great vessels.    Hepatic steatosis. No radiopaque gallstones.  Normal appendix.  Colonic  diverticulosis.  Normal kidneys, adrenal glands, pancreas, spleen,  stomach, urinary bladder.  No suspicious osseous lesion.  Right  perivaginal cyst unchanged  Exam End: 08/14/24 21:29    Specimen Collected: 08/14/24 21:29       thyroid  Order: [id number]  Narrative    PERFORMED AT [facility] LOCATION:[address]      Date of Service: 03/11/2022 18:53 Adm #: [id number]  Reading Dr:MARYCRUZ MORELOS Signoff Dr: MARYCRUZ MORELOS  PROCEDURE:     THYROID - WUS  4679  REASON FOR  EXAM: E04.1 NONTOXIC SINGLE THYROID NODULE  RESULT: Clinical Information: Follow-up thyroid cyst.  Comparison study: 2/27/2020.  Real-time ultrasound of the thyroid gland was performed.  The right lobe measures 4.2 x 1.2 x 1.3 cm.   The left lobe measures 4.4 x 0.9 x 1.7 cm.   The thyroid isthmus measures 0.1 cm in thickness.  There is redemonstration of a small cyst in the upper pole of the left  thyroid lobe measuring 4 x 2 x 3 mm   similar to the prior exam. No new  lesions are identified. On Doppler ultrasound there is symmetric  bilateral blood flow.  Impression: Stable minute colloid cyst in the upper pole of the left  thyroid lobe.  [id number]  This report has been produced using speech recognition.  Original Interpreting Physician:   MARYCRUZ MORELOS M.D.  Original Trans[facility]/Date: [facility]   Mar 12 2022 11:08A  Original Electronically Signed by/Date: MARYCRUZ MORELOS M.D. Mar 12 2022  11:08A  Addendum Interpreting Physician:  Addendum Transcribed by/Date:   NO ADDENDUM  Addendum Electronically Signed by/Date:  Exam End: --    Specimen Collected: 03/11/22 Last Resulted: 03/11/22 00:00   Received From: [facility] [employer]  Result Received: 07/05/24 13:43      Current Outpatient Medications   Medication Instructions    aspirin 81 mg, Daily    cyanocobalamin, vitamin B-12, (Vitamin B-12) 1,000 mcg tablet extended release 1 tablet Orally three days a week    diphenhydrAMINE (SOMINEX) 25 mg, oral, Every 6 hours    ergocalciferol (VITAMIN D-2) 50,000 Units, oral, Weekly    latanoprost (Xalatan) 0.005 % ophthalmic solution 1 drop, Both Eyes, Nightly    losartan (COZAAR) 50 mg, oral, Every 24 hours    lovastatin (MEVACOR) 20 mg, oral, Nightly    metFORMIN XR (Glucophage-XR) 750 mg 24 hr tablet PLEASE SEE ATTACHED FOR DETAILED DIRECTIONS    Mounjaro 2.5 mg, subcutaneous, Weekly    pantoprazole (PROTONIX) 40 mg, oral, Daily, Do not crush, chew, or split. Take 30 minutes before a meal    polyethylene glycol (MIRALAX) 17 g,  oral, Daily, Mix 1 capful in 8 oz of liquid

## 2025-07-02 LAB
25(OH)D3+25(OH)D2 SERPL-MCNC: 100 NG/ML (ref 30–100)
ALBUMIN SERPL-MCNC: 4.2 G/DL (ref 3.6–5.1)
ALBUMIN/CREAT UR: 2 MG/G CREAT
ALBUMIN/CREAT UR: NORMAL MG/G CREAT
ALP SERPL-CCNC: 90 U/L (ref 37–153)
ALT SERPL-CCNC: 14 U/L (ref 6–29)
ANION GAP SERPL CALCULATED.4IONS-SCNC: 12 MMOL/L (CALC) (ref 7–17)
AST SERPL-CCNC: 16 U/L (ref 10–35)
BASOPHILS # BLD AUTO: 27 CELLS/UL (ref 0–200)
BASOPHILS NFR BLD AUTO: 0.4 %
BILIRUB SERPL-MCNC: 0.4 MG/DL (ref 0.2–1.2)
BUN SERPL-MCNC: 7 MG/DL (ref 7–25)
CALCIUM SERPL-MCNC: 9.4 MG/DL (ref 8.6–10.4)
CHLORIDE SERPL-SCNC: 100 MMOL/L (ref 98–110)
CHOLEST SERPL-MCNC: 163 MG/DL
CHOLEST/HDLC SERPL: 3 (CALC)
CO2 SERPL-SCNC: 26 MMOL/L (ref 20–32)
CREAT SERPL-MCNC: 0.73 MG/DL (ref 0.5–1.03)
CREAT UR-MCNC: 116 MG/DL (ref 20–275)
CREAT UR-MCNC: 99 MG/DL (ref 20–275)
EGFRCR SERPLBLD CKD-EPI 2021: 99 ML/MIN/1.73M2
EOSINOPHIL # BLD AUTO: 60 CELLS/UL (ref 15–500)
EOSINOPHIL NFR BLD AUTO: 0.9 %
ERYTHROCYTE [DISTWIDTH] IN BLOOD BY AUTOMATED COUNT: 13.2 % (ref 11–15)
GLUCOSE SERPL-MCNC: 136 MG/DL (ref 65–99)
HCT VFR BLD AUTO: 38.7 % (ref 35–45)
HDLC SERPL-MCNC: 55 MG/DL
HGB BLD-MCNC: 12 G/DL (ref 11.7–15.5)
LDLC SERPL CALC-MCNC: 87 MG/DL (CALC)
LYMPHOCYTES # BLD AUTO: 2908 CELLS/UL (ref 850–3900)
LYMPHOCYTES NFR BLD AUTO: 43.4 %
MCH RBC QN AUTO: 28.8 PG (ref 27–33)
MCHC RBC AUTO-ENTMCNC: 31 G/DL (ref 32–36)
MCV RBC AUTO: 93 FL (ref 80–100)
MICROALBUMIN UR-MCNC: 0.2 MG/DL
MICROALBUMIN UR-MCNC: <0.2 MG/DL
MONOCYTES # BLD AUTO: 509 CELLS/UL (ref 200–950)
MONOCYTES NFR BLD AUTO: 7.6 %
NEUTROPHILS # BLD AUTO: 3196 CELLS/UL (ref 1500–7800)
NEUTROPHILS NFR BLD AUTO: 47.7 %
NONHDLC SERPL-MCNC: 108 MG/DL (CALC)
PLATELET # BLD AUTO: 317 THOUSAND/UL (ref 140–400)
PMV BLD REES-ECKER: 9.3 FL (ref 7.5–12.5)
POTASSIUM SERPL-SCNC: 4.3 MMOL/L (ref 3.5–5.3)
PROT SERPL-MCNC: 6.8 G/DL (ref 6.1–8.1)
RBC # BLD AUTO: 4.16 MILLION/UL (ref 3.8–5.1)
SODIUM SERPL-SCNC: 138 MMOL/L (ref 135–146)
TRIGL SERPL-MCNC: 112 MG/DL
TSH SERPL-ACNC: 2.3 MIU/L
WBC # BLD AUTO: 6.7 THOUSAND/UL (ref 3.8–10.8)

## 2025-07-14 ENCOUNTER — APPOINTMENT (OUTPATIENT)
Dept: OPHTHALMOLOGY | Facility: CLINIC | Age: 55
End: 2025-07-14
Payer: COMMERCIAL

## 2025-07-14 ENCOUNTER — PHARMACY VISIT (OUTPATIENT)
Dept: PHARMACY | Facility: CLINIC | Age: 55
End: 2025-07-14
Payer: COMMERCIAL

## 2025-07-14 DIAGNOSIS — K59.00 CONSTIPATION, UNSPECIFIED CONSTIPATION TYPE: Primary | ICD-10-CM

## 2025-07-14 DIAGNOSIS — H40.1212 LOW-TENSION GLAUCOMA OF RIGHT EYE, MODERATE STAGE: Primary | ICD-10-CM

## 2025-07-14 DIAGNOSIS — K59.09 CHRONIC CONSTIPATION: ICD-10-CM

## 2025-07-14 DIAGNOSIS — H52.7 UNSPECIFIED DISORDER OF REFRACTION: ICD-10-CM

## 2025-07-14 DIAGNOSIS — H40.1221 LOW-TENSION GLAUCOMA OF LEFT EYE, MILD STAGE: ICD-10-CM

## 2025-07-14 DIAGNOSIS — E11.9 DIABETES MELLITUS WITHOUT COMPLICATION: ICD-10-CM

## 2025-07-14 DIAGNOSIS — H25.13 AGE-RELATED NUCLEAR CATARACT OF BOTH EYES: ICD-10-CM

## 2025-07-14 PROCEDURE — RXMED WILLOW AMBULATORY MEDICATION CHARGE

## 2025-07-14 PROCEDURE — 92015 DETERMINE REFRACTIVE STATE: CPT | Performed by: OPHTHALMOLOGY

## 2025-07-14 PROCEDURE — 99214 OFFICE O/P EST MOD 30 MIN: CPT | Performed by: OPHTHALMOLOGY

## 2025-07-14 PROCEDURE — 92133 CPTRZD OPH DX IMG PST SGM ON: CPT | Performed by: OPHTHALMOLOGY

## 2025-07-14 RX ORDER — POLYETHYLENE GLYCOL 3350 17 G/17G
17 POWDER, FOR SOLUTION ORAL DAILY
Qty: 1700 G | Refills: 3 | Status: SHIPPED | OUTPATIENT
Start: 2025-07-14

## 2025-07-14 RX ORDER — DOCUSATE SODIUM 100 MG/1
100 CAPSULE, LIQUID FILLED ORAL DAILY PRN
Qty: 90 CAPSULE | Refills: 0 | Status: SHIPPED | OUTPATIENT
Start: 2025-07-14

## 2025-07-14 ASSESSMENT — REFRACTION_MANIFEST
OS_AXIS: 080
OS_CYLINDER: -0.25
OD_ADD: +2.00
OD_AXIS: 070
OS_ADD: +2.00
METHOD_AUTOREFRACTION: 1
OD_SPHERE: -1.25
OS_SPHERE: -0.50
OS_SPHERE: -1.00
OD_CYLINDER: -0.25
OD_SPHERE: -0.75

## 2025-07-14 ASSESSMENT — PACHYMETRY
OS_CT(UM): 519
OD_CT(UM): 505

## 2025-07-14 ASSESSMENT — VISUAL ACUITY
OS_SC: 20/30
OS_SC+: -2
METHOD: SNELLEN - SINGLE
OD_SC+: -2
OD_PH_SC: 20/25
OD_SC: 20/40
OD_PH_SC+: -2

## 2025-07-14 ASSESSMENT — KERATOMETRY
OD_K1POWER_DIOPTERS: 43.50
OD_AXISANGLE2_DEGREES: 165
OS_AXISANGLE2_DEGREES: 15
OS_K2POWER_DIOPTERS: 44.50
METHOD_AUTO_MANUAL: AUTOMATED
OS_AXISANGLE_DEGREES: 105
OD_K2POWER_DIOPTERS: 44.00
OS_K1POWER_DIOPTERS: 43.75
OD_AXISANGLE_DEGREES: 75

## 2025-07-14 ASSESSMENT — EXTERNAL EXAM - LEFT EYE: OS_EXAM: NORMAL

## 2025-07-14 ASSESSMENT — ENCOUNTER SYMPTOMS
ALLERGIC/IMMUNOLOGIC NEGATIVE: 0
RESPIRATORY NEGATIVE: 0
EYES NEGATIVE: 0
ENDOCRINE NEGATIVE: 0
GASTROINTESTINAL NEGATIVE: 0
CARDIOVASCULAR NEGATIVE: 0
MUSCULOSKELETAL NEGATIVE: 0
CONSTITUTIONAL NEGATIVE: 0
NEUROLOGICAL NEGATIVE: 0
PSYCHIATRIC NEGATIVE: 0
HEMATOLOGIC/LYMPHATIC NEGATIVE: 0

## 2025-07-14 ASSESSMENT — CUP TO DISC RATIO
OS_RATIO: 0.5
OD_RATIO: 0.65

## 2025-07-14 ASSESSMENT — SLIT LAMP EXAM - LIDS
COMMENTS: NORMAL
COMMENTS: NORMAL

## 2025-07-14 ASSESSMENT — TONOMETRY
OD_IOP_MMHG: 11
IOP_METHOD: GOLDMANN APPLANATION
OS_IOP_MMHG: 11

## 2025-07-14 ASSESSMENT — PAIN SCALES - GENERAL: PAINLEVEL_OUTOF10: 0-NO PAIN

## 2025-07-14 ASSESSMENT — EXTERNAL EXAM - RIGHT EYE: OD_EXAM: NORMAL

## 2025-07-14 NOTE — PROGRESS NOTES
Assessment/Plan   Problem List Items Addressed This Visit       Low-tension glaucoma of right eye, moderate stage - Primary    Compared to earliest scans, right eye (OD) not as healthy as before. Last few years fairly stable but cannot rule out some progression. Advised will have continue current TX but if further progression on fields will increase therapy.          Relevant Orders    OCT, Optic Nerve - OU - Both Eyes (Completed)    Low-tension glaucoma of left eye, mild stage    No significant change on OCT with time, will continue current drops. Due for fields next visit.          Relevant Orders    OCT, Optic Nerve - OU - Both Eyes (Completed)    Diabetes mellitus without complication    Advised no signs of diabetic changes on exam. Recommend to continue best sugar control and on need for annual checkup. Understands role of good BP control and weight loss if applicable. Discussed some components of selected studies like WESDR, DCCT, and UKPDS to describe the likely course of diabetes and effects on the eyes.           Age-related nuclear cataract of both eyes    Non significant cataract noted on exam. Discussed the natural course of cataract and may require surgery at some point in the future. Will plan to continue to monitor with serial exam.            Unspecified disorder of refraction    New Rx for glasses/SCL given per patient request. Patient's signature obtained to acknowledge and confirm that a paper copy of glasses/SCL Rx was given to patient in compliance with [employer] Eyeglass Rule. Electronic copy of Rx will also be available via [employer].               Provided reassurance regarding above diagnoses and care received in the office visit today. Discussed outcomes and options along with the importance of treatment compliance. Understands the importance of any follow up visits. Patient instructed to call/communicate with our office if any new issues, questions, or concerns.     Will plan to see back in 6  months Gonzalez visual field (HVF)  or sooner PRN

## 2025-07-14 NOTE — ASSESSMENT & PLAN NOTE
Compared to earliest scans, right eye (OD) not as healthy as before. Last few years fairly stable but cannot rule out some progression. Advised will have continue current TX but if further progression on fields will increase therapy.

## 2025-07-14 NOTE — LETTER
July 14, 2025    Jen Brown MD  [id number]  [facility], [address]  [place] [id number]    Patient: Milagros Parra   YOB: 1970   Date of Visit: 7/14/2025       Dear Dr. Jen Brown MD:    Thank you for referring Milagros Parra to me for evaluation. Here is my assessment and plan of care:    Assessment/Plan:  Milagros was seen today for annual exam.  Diagnoses and all orders for this visit:  Low-tension glaucoma of right eye, moderate stage (Primary)  -     OCT, Optic Nerve - OU - Both Eyes  Low-tension glaucoma of left eye, mild stage  -     OCT, Optic Nerve - OU - Both Eyes  Diabetes mellitus without complication  Age-related nuclear cataract of both eyes  Unspecified disorder of refraction    Right eye:     Left eye:    [x] No diabetes mellitus (DM) retinopathy [x] No diabetes mellitus (DM) retinopathy    [] Mild non-proliferative retinopathy [] Mild non-proliferative retinopathy    [] Moderate non-proliferative retinopathy [] Moderate non-proliferative retinopathy    [] Severe non-proliferative retinopathy [] Severe non-proliferative retinopathy    [] Proliferative retinopathy   [] Proliferative retinopathy    [] Diabetic macular edema   [] Diabetic macular edema    Urged patient to continue to work on best blood sugar and blood pressure control  Advised patient to call if any new vision changes noted    Will plan to repeat evaluation in:   [] 3 months [] 6 months [] 9 months [x] 12 months [] Other    Below you can find relevant pieces of the exam. If you have questions, please do not hesitate to call me. I look forward to following Milagros along with you.         Sincerely,        Chris Calloway MD        CC:   No Recipients         External Exam         Right Left    External Normal Normal              Slit Lamp Exam         Right Left    Lids/Lashes Normal Normal    Conjunctiva/Sclera White and quiet White and quiet    Cornea Clear Clear    Anterior Chamber Deep  "and quiet Deep and quiet    Iris Round and reactive Round and reactive    Lens 1+ nuclear sclerosis 1+ Nuclear sclerosis              Fundus Exam         Right Left    Vitreous vitreous clear and normal vitreous clear and normal    Disc Normal Normal    C/D Ratio 0.65 0.5    Macula Normal Normal    Vessels Normal Normal    Periphery Normal Normal                   <div id=\"MAIN_EXAM_REVIEWED\"></div>     "

## 2025-07-14 NOTE — ASSESSMENT & PLAN NOTE
New Rx for glasses/SCL given per patient request. Patient's signature obtained to acknowledge and confirm that a paper copy of glasses/SCL Rx was given to patient in compliance with [facility] Eyeglass Rule. Electronic copy of Rx will also be available via [employer].

## 2025-07-14 NOTE — PATIENT INSTRUCTIONS
Thank you so much for choosing me to provide your care today!    If you were dilated your vision may remain blurry   or light sensitive for several hours.    The nature of eye and vision problems can require frequent follow up, please make every effort to adhere to any future appointments.    If you have any issues, questions, or concerns,   please do not hesitate to reach out.    If you receive a survey in regards to your care today, please mention any exceptional care my office staff and/or technicians provided.    You can reach our office at this number:    [phone]    Please consider signing up for and utilizing [employer]!  This is the best way to directly reach me or other  providers

## 2025-07-21 DIAGNOSIS — E11.9 TYPE 2 DIABETES MELLITUS WITHOUT COMPLICATION, WITHOUT LONG-TERM CURRENT USE OF INSULIN: ICD-10-CM

## 2025-07-21 RX ORDER — TIRZEPATIDE 2.5 MG/.5ML
2.5 INJECTION, SOLUTION SUBCUTANEOUS WEEKLY
Qty: 2 ML | Refills: 1 | Status: CANCELLED | OUTPATIENT
Start: 2025-07-21

## 2025-07-21 RX ORDER — TIRZEPATIDE 2.5 MG/.5ML
2.5 INJECTION, SOLUTION SUBCUTANEOUS WEEKLY
Qty: 2 ML | Refills: 1 | Status: SHIPPED | OUTPATIENT
Start: 2025-07-21

## 2025-08-01 ENCOUNTER — PHARMACY VISIT (OUTPATIENT)
Dept: PHARMACY | Facility: CLINIC | Age: 55
End: 2025-08-01
Payer: MEDICARE

## 2025-08-01 PROCEDURE — RXMED WILLOW AMBULATORY MEDICATION CHARGE

## 2025-08-25 ENCOUNTER — PHARMACY VISIT (OUTPATIENT)
Dept: PHARMACY | Facility: CLINIC | Age: 55
End: 2025-08-25
Payer: MEDICARE

## 2025-08-25 PROCEDURE — RXMED WILLOW AMBULATORY MEDICATION CHARGE

## 2025-09-22 ENCOUNTER — APPOINTMENT (OUTPATIENT)
Dept: RADIOLOGY | Facility: HOSPITAL | Age: 55
End: 2025-09-22
Payer: COMMERCIAL

## 2026-01-22 ENCOUNTER — APPOINTMENT (OUTPATIENT)
Dept: OPHTHALMOLOGY | Facility: CLINIC | Age: 56
End: 2026-01-22
Payer: COMMERCIAL